# Patient Record
Sex: FEMALE | Race: WHITE | NOT HISPANIC OR LATINO | Employment: FULL TIME | URBAN - METROPOLITAN AREA
[De-identification: names, ages, dates, MRNs, and addresses within clinical notes are randomized per-mention and may not be internally consistent; named-entity substitution may affect disease eponyms.]

---

## 2017-02-20 ENCOUNTER — HOSPITAL ENCOUNTER (OUTPATIENT)
Dept: MAMMOGRAPHY | Facility: HOSPITAL | Age: 47
Discharge: HOME | End: 2017-02-20
Attending: OBSTETRICS & GYNECOLOGY

## 2017-02-20 DIAGNOSIS — Z12.31 VISIT FOR SCREENING MAMMOGRAM: ICD-10-CM

## 2018-07-25 ENCOUNTER — HOSPITAL ENCOUNTER (OUTPATIENT)
Dept: MAMMOGRAPHY | Facility: CLINIC | Age: 48
Discharge: HOME | End: 2018-07-25
Attending: OBSTETRICS & GYNECOLOGY

## 2018-07-25 DIAGNOSIS — Z12.31 VISIT FOR SCREENING MAMMOGRAM: ICD-10-CM

## 2019-01-07 ENCOUNTER — DOCUMENTATION ONLY (OUTPATIENT)
Dept: OTHER | Facility: CLINIC | Age: 49
End: 2019-01-07

## 2019-09-16 ENCOUNTER — HOSPITAL ENCOUNTER (OUTPATIENT)
Dept: MAMMOGRAPHY | Facility: CLINIC | Age: 49
Discharge: HOME | End: 2019-09-16
Attending: OBSTETRICS & GYNECOLOGY

## 2019-09-16 DIAGNOSIS — Z12.31 VISIT FOR SCREENING MAMMOGRAM: ICD-10-CM

## 2019-09-20 ENCOUNTER — OFFICE VISIT (OUTPATIENT)
Dept: SLEEP MEDICINE | Facility: CLINIC | Age: 49
End: 2019-09-20
Payer: COMMERCIAL

## 2019-09-20 VITALS
OXYGEN SATURATION: 98 % | WEIGHT: 204 LBS | SYSTOLIC BLOOD PRESSURE: 113 MMHG | BODY MASS INDEX: 30.92 KG/M2 | DIASTOLIC BLOOD PRESSURE: 77 MMHG | HEIGHT: 68 IN | HEART RATE: 84 BPM

## 2019-09-20 DIAGNOSIS — G47.01 INSOMNIA DUE TO MEDICAL CONDITION: Primary | ICD-10-CM

## 2019-09-20 PROCEDURE — 99205 OFFICE O/P NEW HI 60 MIN: CPT | Performed by: FAMILY MEDICINE

## 2019-09-20 RX ORDER — SERTRALINE HYDROCHLORIDE 100 MG/1
TABLET, FILM COATED ORAL
COMMUNITY
Start: 2019-08-21

## 2019-09-20 RX ORDER — CLONAZEPAM 1 MG/1
TABLET ORAL
Qty: 30 TABLET | Refills: 5 | Status: SHIPPED | OUTPATIENT
Start: 2019-09-20 | End: 2019-10-31

## 2019-09-20 RX ORDER — NORETHINDRONE ACETATE AND ETHINYL ESTRADIOL .03; 1.5 MG/1; MG/1
1 TABLET ORAL
COMMUNITY
End: 2019-09-20

## 2019-09-20 RX ORDER — NORETHINDRONE ACETATE AND ETHINYL ESTRADIOL, ETHINYL ESTRADIOL AND FERROUS FUMARATE 1MG-10(24)
KIT ORAL
COMMUNITY
Start: 2019-09-19

## 2019-09-20 ASSESSMENT — MIFFLIN-ST. JEOR: SCORE: 1594.87

## 2019-09-20 NOTE — PATIENT INSTRUCTIONS
Your BMI is Body mass index is 31.25 kg/m .  Weight management is a personal decision.  If you are interested in exploring weight loss strategies, the following discussion covers the approaches that may be successful. Body mass index (BMI) is one way to tell whether you are at a healthy weight, overweight, or obese. It measures your weight in relation to your height.  A BMI of 18.5 to 24.9 is in the healthy range. A person with a BMI of 25 to 29.9 is considered overweight, and someone with a BMI of 30 or greater is considered obese. More than two-thirds of American adults are considered overweight or obese.  Being overweight or obese increases the risk for further weight gain. Excess weight may lead to heart disease and diabetes.  Creating and following plans for healthy eating and physical activity may help you improve your health.  Weight control is part of healthy lifestyle and includes exercise, emotional health, and healthy eating habits. Careful eating habits lifelong are the mainstay of weight control. Though there are significant health benefits from weight loss, long-term weight loss with diet alone may be very difficult to achieve- studies show long-term success with dietary management in less than 10% of people. Attaining a healthy weight may be especially difficult to achieve in those with severe obesity. In some cases, medications, devices and surgical management might be considered.  What can you do?  If you are overweight or obese and are interested in methods for weight loss, you should discuss this with your provider.     Consider reducing daily calorie intake by 500 calories.     Keep a food journal.     Avoiding skipping meals, consider cutting portions instead.    Diet combined with exercise helps maintain muscle while optimizing fat loss. Strength training is particularly important for building and maintaining muscle mass. Exercise helps reduce stress, increase energy, and improves fitness.  Increasing exercise without diet control, however, may not burn enough calories to loose weight.       Start walking three days a week 10-20 minutes at a time    Work towards walking thirty minutes five days a week     Eventually, increase the speed of your walking for 1-2 minutes at time    In addition, we recommend that you review healthy lifestyles and methods for weight loss available through the National Institutes of Health patient information sites:  http://win.niddk.nih.gov/publications/index.htm    And look into health and wellness programs that may be available through your health insurance provider, employer, local community center, or parminder club.    Weight management plan: Patient was referred to their PCP to discuss a diet and exercise plan.

## 2019-09-20 NOTE — PROGRESS NOTES
"Sleep Consultation:    Date on this visit: 9/20/2019    Zeynep Khan is self-referred for a sleep consultation regarding chronic insomnia.    Primary Physician: No Ref-Primary, Physician     Chief Complaint: \"I haven't slept well in 9 years.\"    HPI:  Zeynep Khan is a pleasant 47 yo who presents for multiple year history of difficulty falling and staying asleep.    Complex and pertinent PMHx of ulcerative colitis s/p partial colectomy, PTSD, unclear demyelinating disorder, chronic migraines, anxiety.    Zeynep feels she actually slept well until ~9 years ago and links it to the diagnosis of ulcerative colitis, treatment with repeat steroids, partial colectomy.  Prior to this, she slept for 9-10 hours from 9pm to 6am.  She feel that her sleep never seemed to improve.  Currently, she estimates her total sleep time at 2-3 hours most nights.    She reports that she is following strict sleep hygiene, stimulus control given that her daughter follows with Dr. Bloom for chronic insomnia.    Most nights, in bed ~9:30pm.  She will typically take 1-2 hours to initiate sleep, usually around 11:30pm.  It is somewhat unclear to me what she is doing during this time prior to falling asleep.  She will awaken spontaneously around 1:30am and will then have difficulty and often not return to sleep.  She does practice stimulus control during this time.  She is up for the day at 6am.  She states taking only rare naps.  Her sleep away from is typically worse and is unsure of any changes when going to bed later than typical    For her sleep, she has tried a number of medications:  - Benadryl -> minimal benefit  - Zolpidem -> minimal benefit  - Eszopiclone -> minimal benefit  - Trazodone -> minimal benefit  - Muscle relaxants -> minimal benefit  - Lorazepam -> benefit, but not used routinely for sleep    She denies any snoring, observed apnea.  Denies any abnormal nocturnal behaviors.    She can awaken with feeling of heart racing, " SOB.    Negative narcolepsy triad.    She currently will have a flare of her ulcerative colitis ~1-2x / week, usually managed with steroids.  Does not need to defecate overnight, due to tight control on diet.    She lives with her daughter, age 17, who has significant auto-immune disorders.  Overall, feels stress is relatively low.  Works as an occupational therapist.    Patient's [place] Sleepiness score 4/24 consistent with no daytime sleepiness.        Allergies:    Allergies   Allergen Reactions     Black Pepper [[place]] Anaphylaxis     Contrast Dye Anaphylaxis     Wasp Venom Anaphylaxis     Amoxicillin Itching     Droperidol Other (See Comments)     Hyperactivity      Penicillins Hives     Prochlorperazine Unknown     Sulfa Drugs GI Disturbance       Medications:    Current Outpatient Medications   Medication Sig Dispense Refill     benzonatate (TESSALON) 200 MG capsule Take 1 capsule (200 mg) by mouth 3 times daily as needed for cough 30 capsule 3     dexlansoprazole (DEXILANT) 60 MG CPDR Take 60 mg by mouth daily       Dicyclomine HCl (BENTYL PO) Take 20 mg by mouth 4 times daily       DiphenhydrAMINE HCl (BENADRYL PO) Take by mouth as needed       drospirenone-ethinyl estradiol (LOVE) 3-0.03 MG per tablet        EPINEPHrine 0.3 MG/0.3ML injection 2-pack Inject 0.3 mg into the muscle as needed for anaphylaxis       Esomeprazole Magnesium (NEXIUM PO) Take 40 mg by mouth daily       Ondansetron (ZOFRAN ODT PO) Take by mouth 2 times daily         Problem List:  There are no active problems to display for this patient.       Past Medical/Surgical History:  History reviewed. No pertinent past medical history.  Past Surgical History:   Procedure Laterality Date     ENT SURGERY         Social History:  Social History     Socioeconomic History     Marital status:      Spouse name: Not on file     Number of children: Not on file     Years of education: Not on file     Highest education level: Not on file    Occupational History     Not on file   Social Needs     Financial resource strain: Not on file     Food insecurity:     Worry: Not on file     Inability: Not on file     Transportation needs:     Medical: Not on file     Non-medical: Not on file   Tobacco Use     Smoking status: Never Smoker     Smokeless tobacco: Never Used   Substance and Sexual Activity     Alcohol use: No     Drug use: No     Sexual activity: Not Currently     Partners: Male     Birth control/protection: Pill   Lifestyle     Physical activity:     Days per week: Not on file     Minutes per session: Not on file     Stress: Not on file   Relationships     Social connections:     Talks on phone: Not on file     Gets together: Not on file     Attends [employer] service: Not on file     Active member of club or organization: Not on file     Attends meetings of clubs or organizations: Not on file     Relationship status: Not on file     Intimate partner violence:     Fear of current or ex partner: Not on file     Emotionally abused: Not on file     Physically abused: Not on file     Forced sexual activity: Not on file   Other Topics Concern     Not on file   Social History Narrative     Not on file       Family History:  Family History   Problem Relation Age of Onset     Dementia Paternal Grandmother      Cancer Father      Hypertension Father      Migraines Maternal Grandmother        Review of Systems:  A complete review of systems reviewed by me is negative with the exeption of what has been mentioned in the history of present illness.  CONSTITUTIONAL:  POSITIVE for  weight gain and sweats  EYES: NEGATIVE for changes in vision, blind spots, double vision.  ENT: NEGATIVE for ear pain, sore throat, sinus pain, post-nasal drip, runny nose, bloody nose  CARDIAC:  POSITIVE for  fast heart beats  NEUROLOGIC:  POSITIVE for  headaches and weakness or numbness in the arms or legs  DERMATOLOGIC: NEGATIVE for rashes, new moles or change in  "mole(s)  PULMONARY:  POSITIVE for  dry cough  GASTROINTESTINAL:  POSITIVE for  nausea and loose or watery stools  GENITOURINARY:  POSITIVE for  urinating more frequently than usual  MUSCULOSKELETAL:  POSITIVE for  bone or joint pain and swollen joints  ENDOCRINE: NEGATIVE for increased thirst or urination, diabetes.  LYMPHATIC: NEGATIVE for swollen lymph nodes, lumps or bumps in the breasts or nipple discharge.    Physical Examination:  Vitals: /77   Pulse 84   Ht 1.721 m (5' 7.75\")   Wt 92.5 kg (204 lb)   SpO2 98%   BMI 31.25 kg/m    BMI= Body mass index is 31.25 kg/m .    Neck Cir (cm): 34 cm    [place] Total Score 9/20/2019   Total score - [place] 4            GENERAL APPEARANCE: healthy, alert, no distress and over weight  RESP: lungs clear to auscultation - no rales, rhonchi or wheezes  CV: regular rates and rhythm, normal S1 S2, no S3 or S4 and no murmur, click or rub  PSYCH: mentation appears normal and affect normal/bright    Impression/Plan:    Zeynep Khan is a pleasant 49 yo who presents for multiple year history of difficulty falling and staying asleep.    Complex and pertinent PMHx of ulcerative colitis s/p partial colectomy, PTSD, unclear demyelinating disorder, chronic migraines, anxiety.    1.)  Chronic sleep onset and maintenance insomnia   - Onset of symptoms with diagnosis and treatment of ulcerative colitis ~9 years ago.   - Overall, appears to be strongly multi-factorial with components of significant medical illness, conditioned / psychophysiological insomnia, sleep state misperception (reported TST 2-3 hours, normal [place]).   - Reports following consistent sleep hygiene, stimulus control.   - Overall, I feel a combination of continued behavioral modification, consideration of CBT for insomnia, and medication is reasonable.  Plan for trial of clonazepam 0.5-1mg PO at bedtime.  Touch base in 1-2 weeks by phone.   - Will hold on in-lab PSG given low clinical concern for ROSS, but " can't rule out periodic limb movement disorder.  Also consider actigraphy if minimal response to medication.      Plan as given to patient as above.    I have spent 60 minutes with this patient today in which greater than 50% of this time was spent in the counseling / coordination of care regarding insomnia.    Santiago Veloz MD    CC: No ref. provider found

## 2019-09-20 NOTE — NURSING NOTE
"Chief Complaint   Patient presents with     Consult For     Consult per Dr. Adams RE: Difficult falling and staying asleep.        Initial /77   Pulse 84   Ht 1.721 m (5' 7.75\")   Wt 92.5 kg (204 lb)   SpO2 98%   BMI 31.25 kg/m   Estimated body mass index is 31.25 kg/m  as calculated from the following:    Height as of this encounter: 1.721 m (5' 7.75\").    Weight as of this encounter: 92.5 kg (204 lb).    Medication Reconciliation: complete    Neck circumference: 13.5 inches / 33.5 centimeters.    DME: none  "

## 2019-10-09 ENCOUNTER — TELEPHONE (OUTPATIENT)
Dept: SLEEP MEDICINE | Facility: CLINIC | Age: 49
End: 2019-10-09

## 2019-10-09 NOTE — TELEPHONE ENCOUNTER
Pt called in to give update that the Clonazepam is not helping. She has increased to 1 tablet, and would like to know how to proceed.     Lizzie Linares MA on 10/9/2019 at 9:05 AM

## 2019-10-10 NOTE — TELEPHONE ENCOUNTER
I would recommend we try a trial of increasing to 1.5 tablets for 2-3 nights and then to 2 tablets.  If still minimal efficacy, I would consider proceeding with actigraphy to get an objective picture of her sleep pattern.

## 2019-10-14 NOTE — TELEPHONE ENCOUNTER
Patient called back and I explained Dr. Veloz's notes below. She will increase the med as requested and call back next week to  Let us know how she is doing. If improved a new RX will need to be sent with a change in directions and Quantity. If not an actigraphy may be ordered.

## 2019-10-30 ENCOUNTER — MYC MEDICAL ADVICE (OUTPATIENT)
Dept: SLEEP MEDICINE | Facility: CLINIC | Age: 49
End: 2019-10-30

## 2019-10-30 DIAGNOSIS — G47.01 INSOMNIA DUE TO MEDICAL CONDITION: ICD-10-CM

## 2019-10-31 RX ORDER — CLONAZEPAM 1 MG/1
TABLET ORAL
Qty: 60 TABLET | Refills: 5 | Status: SHIPPED | OUTPATIENT
Start: 2019-10-31 | End: 2020-05-11

## 2019-12-15 ENCOUNTER — HEALTH MAINTENANCE LETTER (OUTPATIENT)
Age: 49
End: 2019-12-15

## 2020-05-11 DIAGNOSIS — G47.01 INSOMNIA DUE TO MEDICAL CONDITION: ICD-10-CM

## 2020-05-11 RX ORDER — CLONAZEPAM 1 MG/1
TABLET ORAL
Qty: 60 TABLET | Refills: 5 | Status: SHIPPED | OUTPATIENT
Start: 2020-05-11 | End: 2020-11-24

## 2020-05-11 NOTE — TELEPHONE ENCOUNTER
clonazePAM (KLONOPIN) 1 MG tablet   Last Written Prescription Date:  10/31/2019  Last Fill Quantity: 60,   # refills: 5  Last Office Visit: 9/20/19  Future Office visit:   1 year    Routing refill request to provider for review/approval because:  Drug not on the [facility], P or [facility] refill protocol or controlled substance

## 2020-11-24 DIAGNOSIS — G47.01 INSOMNIA DUE TO MEDICAL CONDITION: ICD-10-CM

## 2020-11-24 NOTE — TELEPHONE ENCOUNTER
clonazePAM (KLONOPIN) 1 MG tablet  Last Written Prescription Date:  5/11/2020  Last Fill Quantity: 60,   # refills: 5  Last Office Visit: 9/20/19  Future Office visit: 1 year   - Will send mychart     Routing refill request to provider for review/approval because:  Drug not on the [facility], P or [facility] refill protocol or controlled substance

## 2020-11-27 RX ORDER — CLONAZEPAM 1 MG/1
TABLET ORAL
Qty: 60 TABLET | Refills: 1 | Status: SHIPPED | OUTPATIENT
Start: 2020-11-27 | End: 2021-03-08

## 2020-12-07 ENCOUNTER — VIRTUAL VISIT (OUTPATIENT)
Dept: SLEEP MEDICINE | Facility: CLINIC | Age: 50
End: 2020-12-07
Payer: COMMERCIAL

## 2020-12-07 DIAGNOSIS — G47.01 INSOMNIA DUE TO MEDICAL CONDITION: Primary | ICD-10-CM

## 2020-12-07 PROCEDURE — 99214 OFFICE O/P EST MOD 30 MIN: CPT | Mod: GT | Performed by: FAMILY MEDICINE

## 2020-12-07 RX ORDER — TEMAZEPAM 15 MG/1
CAPSULE ORAL
Qty: 60 CAPSULE | Refills: 5 | Status: SHIPPED | OUTPATIENT
Start: 2020-12-07 | End: 2021-03-08

## 2020-12-07 NOTE — PROGRESS NOTES
"Zeynep Khan is a 50 year old female who is being evaluated via a billable video visit.      The patient has been notified of following:     \"This video visit will be conducted via a call between you and your physician/provider. We have found that certain health care needs can be provided without the need for an in-person physical exam.  This service lets us provide the care you need with a video conversation.  If a prescription is necessary we can send it directly to your pharmacy.  If lab work is needed we can place an order for that and you can then stop by our lab to have the test done at a later time.    Video visits are billed at different rates depending on your insurance coverage.  Please reach out to your insurance provider with any questions.    If during the course of the call the physician/provider feels a video visit is not appropriate, you will not be charged for this service.\"    Patient has given verbal consent for Video visit? Yes  How would you like to obtain your AVS? MyChart  If you are dropped from the video visit, the video invite should be resent to: Send to e-mail at: [email or web address]  Will anyone else be joining your video visit? No        Video-Visit Details    Type of service:  Video Visit    Video Start Time: 11:30am  Video End Time: 11:55am    Originating Location (pt. Location): Home    Distant Location (provider location):  [facility]     Platform used for Video Visit: [employer]    Virtual visit for annual follow-up of chronic insomnia.    Impression/Plan:    1.)  Chronic sleep onset and maintenance insomnia   - Onset of symptoms with diagnosis and treatment of ulcerative colitis ~9 years ago.   - Overall, appears to be strongly multi-factorial with components of significant medical illness, conditioned / psychophysiological insomnia, sleep state misperception (reported TST 2-4 hours, normal [place]).   - Reports following consistent sleep hygiene, " stimulus control.   - Prior response to clonazepam 2mg.   - Overall, I feel a combination of continued behavioral modification, consideration of CBT for insomnia, and medication is reasonable.  Plan for trial of changing to temazepam 15-30mg PO QHS.  Touch base in 1-2 weeks by phone.   - Will hold on in-lab PSG given low clinical concern for ROSS, but can't rule out periodic limb movement disorder.  Also consider actigraphy if minimal response to medication.    51 yo F with Complex and pertinent PMHx of ulcerative colitis s/p partial colectomy, PTSD, unclear demyelinating disorder, chronic migraines, anxiety.    Last office visit was our initial consult on 9/20/2019.  Chronic sleep onset and maintenance insomnia presumed with multi-factorial components of significant medical illness, psychophysiological insomnia, sleep-state misperception.  Prior trial of multiple medications.  Plan to continue behavioral modification, CBT for insomnia, trial of clonazepam 0.5-1mg PO at bedtime.    Today, she presents for annual follow-up.  She feels her sleep was stable up until ~3 months ago, prior to this, sleeping ~6 hours per night and seemed adequate.  She is not aware of any changes in medical changes or history around this time.  She feels her sleep has been more interrupted by what sounds like sleep starts.    Current pattern of taking clonazepam 1-2mg PO ~8pm.  In bed ~8:30pm.  Asleep ~9:30pm, will awaken spontaneously ~2 hours later and then feels she sleeps off / on.  Estimates total sleep time at 2-4 hours.    ---  This note was written with the assistance of the Dragon voice-dictation technology software. The final document, although reviewed, may contain errors. For corrections, please contact the office.    Santiago Veloz MD    Sleep Medicine  [facility]  ([phone])  [facility]  ([phone])

## 2020-12-07 NOTE — PATIENT INSTRUCTIONS

## 2021-01-15 ENCOUNTER — HEALTH MAINTENANCE LETTER (OUTPATIENT)
Age: 51
End: 2021-01-15

## 2021-01-23 ENCOUNTER — HEALTH MAINTENANCE LETTER (OUTPATIENT)
Age: 51
End: 2021-01-23

## 2021-03-05 VITALS — WEIGHT: 180 LBS | HEIGHT: 68 IN | BODY MASS INDEX: 27.28 KG/M2

## 2021-03-05 ASSESSMENT — MIFFLIN-ST. JEOR: SCORE: 1476

## 2021-03-05 NOTE — PATIENT INSTRUCTIONS
Your BMI is Body mass index is 27.57 kg/m .  Weight management is a personal decision.  If you are interested in exploring weight loss strategies, the following discussion covers the approaches that may be successful. Body mass index (BMI) is one way to tell whether you are at a healthy weight, overweight, or obese. It measures your weight in relation to your height.  A BMI of 18.5 to 24.9 is in the healthy range. A person with a BMI of 25 to 29.9 is considered overweight, and someone with a BMI of 30 or greater is considered obese. More than two-thirds of American adults are considered overweight or obese.  Being overweight or obese increases the risk for further weight gain. Excess weight may lead to heart disease and diabetes.  Creating and following plans for healthy eating and physical activity may help you improve your health.  Weight control is part of healthy lifestyle and includes exercise, emotional health, and healthy eating habits. Careful eating habits lifelong are the mainstay of weight control. Though there are significant health benefits from weight loss, long-term weight loss with diet alone may be very difficult to achieve- studies show long-term success with dietary management in less than 10% of people. Attaining a healthy weight may be especially difficult to achieve in those with severe obesity. In some cases, medications, devices and surgical management might be considered.  What can you do?  If you are overweight or obese and are interested in methods for weight loss, you should discuss this with your provider.     Consider reducing daily calorie intake by 500 calories.     Keep a food journal.     Avoiding skipping meals, consider cutting portions instead.    Diet combined with exercise helps maintain muscle while optimizing fat loss. Strength training is particularly important for building and maintaining muscle mass. Exercise helps reduce stress, increase energy, and improves fitness.  Increasing exercise without diet control, however, may not burn enough calories to loose weight.       Start walking three days a week 10-20 minutes at a time    Work towards walking thirty minutes five days a week     Eventually, increase the speed of your walking for 1-2 minutes at time    In addition, we recommend that you review healthy lifestyles and methods for weight loss available through the National Institutes of Health patient information sites:  http://win.niddk.nih.gov/publications/index.htm    And look into health and wellness programs that may be available through your health insurance provider, employer, local community center, or parminder club.    Weight management plan: Patient was referred to their PCP to discuss a diet and exercise plan.

## 2021-03-05 NOTE — PROGRESS NOTES
"Zeynep Khan is a 50 year old female who is being evaluated via a billable video visit.       The patient has been notified of following:      \"This video visit will be conducted via a call between you and your physician/provider. We have found that certain health care needs can be provided without the need for an in-person physical exam.  This service lets us provide the care you need with a video conversation.  If a prescription is necessary we can send it directly to your pharmacy.  If lab work is needed we can place an order for that and you can then stop by our lab to have the test done at a later time.     Video visits are billed at different rates depending on your insurance coverage.  Please reach out to your insurance provider with any questions.     If during the course of the call the physician/provider feels a video visit is not appropriate, you will not be charged for this service.\"     Patient has given verbal consent for Video visit? Yes  How would you like to obtain your AVS? Mail a copy  If you are dropped from the video visit, the video invite should be resent to: Text to cell phone: -  Will anyone else be joining your video visit? No  If patient encounters technical issues they should call [phone]      Video-Visit Details     Type of service:  Video Visit     Video Start Time: 0800  Video End Time: 0830    Originating Location (pt. Location): Home     Distant Location (provider location):  [facility]      Platform used for Video Visit: MedicaMet[employer]    Virtual visit for chronic insomnia.     Impression/Plan:     1.)  Chronic sleep onset and maintenance insomnia   - Onset of symptoms with diagnosis and treatment of ulcerative colitis 10+ years ago and s/p partial colectomy.   - Overall, appears to be strongly multi-factorial with components of significant medical illness, conditioned / psychophysiological insomnia, sleep state misperception (reported TST 2-4 hours, " normal Angelica).   - Reports following consistent sleep hygiene, stimulus control.   - Prior response to clonazepam 1-2mg, though with worsening symptoms ~3-6 months ago with unclear trigger.  Minimal response to clonazepam 0.5-1mg and temazepam 15-30mg since that time.   - Given minimal response, plan to proceed with actigraphy to assess for sleep-state misperception, plan to taper temazepam / clonazepam and trial of suvorexant 10-20mg PO QHS.  If significant sleep state misperception is seen, likely plan to proceed with formal cognitive-behavioral therapy for insomnia.    SUBJECTIVE:  Zeynep Khan is a 50 year old year old female.    Complex and pertinent PMHx of ulcerative colitis s/p partial colectomy, PTSD, unclear demyelinating disorder, chronic migraines, anxiety.    Last office visit was our initial consult on 9/20/2019.  Chronic sleep onset and maintenance insomnia presumed with multi-factorial components of significant medical illness, psychophysiological insomnia, sleep-state misperception.  Prior trial of multiple medications.  Plan to continue behavioral modification, CBT for insomnia, trial of clonazepam 0.5-1mg PO at bedtime.     12/7/2020 - She presents for annual follow-up.  She feels her sleep was stable up until ~3 months ago, prior to this, sleeping ~6 hours per night and seemed adequate.  She is not aware of any changes in medical changes or history around this time.  She feels her sleep has been more interrupted by what sounds like sleep starts.  Current pattern of taking clonazepam 1-2mg PO ~8pm.  In bed ~8:30pm.  Asleep ~9:30pm, will awaken spontaneously ~2 hours later and then feels she sleeps off / on.  Estimates total sleep time at 2-4 hours.  Plan for trial of temazepam 15-30, consider actigraphy.    Today -she presents today for 3-month follow-up.  She feels that the response to changing to temazepam has not been as well as she had hoped.  She feels she is sleeping soundly for 3-4 hours.  " She feels she still has significant issues with sleep maintenance on 5 out of 7 nights.  She also feels that she has had a few episodes at least of sleep eating since starting the temazepam, this was not reported previously with clonazepam or zolpidem or eszopiclone in the past.    She currently take her medication between 8-8:30 PM.  She will get into bed between 8-8:30 PM.  She will then seem to fall asleep between 10-10:30 PM, it is unclear to me what she is doing during this 1-2 hours prior to sleep onset.  She feels she will sleep soundly until roughly 1 AM.  She will then seem to awaken every 30-45 minutes until awakening naturally around 630-7 AM.  Since her temazepam, she has found her self feeling sleepy during the day, and has infrequently taken a 30-minute nap.    She denies any snoring or observed apnea.  She is working with a new therapist.  As reviewed medications, she does recall taking nortriptyline years ago after her partial colectomy for pain, but does not recall if it had benefits for sleep.       Past medical history:    There are no active problems to display for this patient.      10 point ROS of systems including Constitutional, Eyes, Respiratory, Cardiovascular, Gastroenterology, Genitourinary, Integumentary, Muscularskeletal, Psychiatric were all negative except for pertinent positives noted in my HPI.    OBJECTIVE:  Ht 1.721 m (5' 7.75\")   Wt 81.6 kg (180 lb)   BMI 27.57 kg/m      Physical Exam  Constitutional:       General: She is not in acute distress.     Appearance: Normal appearance. She is not ill-appearing, toxic-appearing or diaphoretic.   HENT:      Head: Normocephalic and atraumatic.      Right Ear: External ear normal.      Left Ear: External ear normal.      Nose: Nose normal.   Eyes:      Conjunctiva/sclera: Conjunctivae normal.   Pulmonary:      Effort: Pulmonary effort is normal. No respiratory distress.   Skin:     Coloration: Skin is not jaundiced or pale.      " Findings: No bruising or erythema.   Neurological:      General: No focal deficit present.      Mental Status: She is alert and oriented to person, place, and time.   Psychiatric:         Mood and Affect: Mood normal.         Behavior: Behavior normal.         Thought Content: Thought content normal.         Judgment: Judgment normal.          ---  This note was written with the assistance of the Dragon voice-dictation technology software. The final document, although reviewed, may contain errors. For corrections, please contact the office.    Santiago Veloz MD    Sleep Medicine  [facility]  ([phone])  [facility]  ([phone])

## 2021-03-08 ENCOUNTER — VIRTUAL VISIT (OUTPATIENT)
Dept: SLEEP MEDICINE | Facility: CLINIC | Age: 51
End: 2021-03-08
Payer: COMMERCIAL

## 2021-03-08 DIAGNOSIS — G47.01 INSOMNIA DUE TO MEDICAL CONDITION: ICD-10-CM

## 2021-03-08 PROCEDURE — 99214 OFFICE O/P EST MOD 30 MIN: CPT | Mod: GT | Performed by: FAMILY MEDICINE

## 2021-03-08 RX ORDER — CLONAZEPAM 1 MG/1
TABLET ORAL
Qty: 60 TABLET | Refills: 1 | Status: SHIPPED | OUTPATIENT
Start: 2021-03-08 | End: 2021-07-30

## 2021-03-10 NOTE — TELEPHONE ENCOUNTER
Dave faxed notification Belsomra is not covered medication on Zeynep's Insurance.  Covered medication would be zolpidem 10 mg tab, no pa required, Dayvigo 5 mg tabs, pa required.  Routed to Dr. Veloz for review.

## 2021-03-11 ENCOUNTER — TELEPHONE (OUTPATIENT)
Dept: SLEEP MEDICINE | Facility: CLINIC | Age: 51
End: 2021-03-11

## 2021-03-11 RX ORDER — LEMBOREXANT 5 MG/1
1 TABLET, FILM COATED ORAL AT BEDTIME
Qty: 30 TABLET | Refills: 3 | Status: SHIPPED | OUTPATIENT
Start: 2021-03-11 | End: 2022-10-21

## 2021-03-11 NOTE — TELEPHONE ENCOUNTER
Dayvigo (Lemborexant) is also a hypocretin / orexin antogonist.  Will send rx for Dayvigo 5mg PO at bedtime for PA.

## 2021-03-11 NOTE — TELEPHONE ENCOUNTER
Prior Authorization Specialty Medication Request    Medication/Dose: rec'd faxed prior auth Dave Hazel, DAyvigo 5 mg tabs      Dave initiated prior auth 1-4 [employer]  KEY;  [id number]  ICD code (if different than what is on RX):    Previously Tried and Failed:      Important Lab Values:   Rationale:  Insurance Name:  open access  Insurance ID: [id number]  Insurance Phone Number:  Pharmacy Information (if different than what is on RX)  Name: [facility]   Phone:  [phone]      Routed PA Med Team, Dr. Veloz for review.

## 2021-03-11 NOTE — TELEPHONE ENCOUNTER
Central Prior Authorization Team   Phone: [phone]      PA Initiation    Medication: Lemborexant (DAYVIGO) 5 MG TABS  Insurance Company: [employer] - Phone [phone] Fax [phone]  Pharmacy Filling the Rx: [id number] [place], [place] - [address] AT [facility] [place] [place] [address]  Filling Pharmacy Phone: [phone]  Filling Pharmacy Fax:    Start Date: 3/11/2021

## 2021-03-12 NOTE — TELEPHONE ENCOUNTER
Prior Authorization Approval    Authorization Effective Date: 2/12/2021  Authorization Expiration Date: 3/12/2024  Medication: Lemborexant (DAYVIGO) 5 MG TABS  Approved Dose/Quantity: 30  Reference #: [id number]   Insurance Company: [employer] - Phone [phone] Fax [phone]  Expected CoPay:       Which Pharmacy is filling the prescription (Not needed for infusion/clinic administered): [id number] [address] AT [facility] [address]  Pharmacy Notified: Yes - spoke to female staff. They will order medication for Monday if they don't have enough, patient will be notified when rx is ready.  Patient Notified: No

## 2021-03-15 ENCOUNTER — TELEPHONE (OUTPATIENT)
Dept: SLEEP MEDICINE | Facility: CLINIC | Age: 51
End: 2021-03-15

## 2021-03-15 NOTE — TELEPHONE ENCOUNTER
[facility] to schedule Acti w/ PSG and follow up with Dr. Veloz.

## 2021-03-29 ENCOUNTER — TELEPHONE (OUTPATIENT)
Dept: SLEEP MEDICINE | Facility: CLINIC | Age: 51
End: 2021-03-29

## 2021-05-18 ENCOUNTER — TELEPHONE (OUTPATIENT)
Dept: SLEEP MEDICINE | Facility: CLINIC | Age: 51
End: 2021-05-18

## 2021-05-18 NOTE — TELEPHONE ENCOUNTER
I called to set up Zeynep's acti  and drop off. She mentioned that she called and had staff send a few messages to Dr. Veloz. She was wondering if he was still with [facility] as she hadn't hears back. She has questioned about her medications and would like a call back. The best number to call is [phone]. I will forward this message to Dr. Veloz. She did not want to schedule her follow up at this time as her schedule is changing a lot right now.

## 2021-06-24 ENCOUNTER — OFFICE VISIT (OUTPATIENT)
Dept: SLEEP MEDICINE | Facility: CLINIC | Age: 51
End: 2021-06-24
Payer: COMMERCIAL

## 2021-06-24 DIAGNOSIS — F51.04 CHRONIC INSOMNIA: Primary | ICD-10-CM

## 2021-06-29 ENCOUNTER — TELEPHONE (OUTPATIENT)
Dept: SLEEP MEDICINE | Facility: CLINIC | Age: 51
End: 2021-06-29

## 2021-06-29 NOTE — TELEPHONE ENCOUNTER
Zeynep called in and left a message that she was not able to complete actigraphy. She had an emergency turn up and needed to return the device. She wanted to know if a neighbor could return. I called her twice but left one message for her to have someone return the device but I have not heard back from her.

## 2021-07-14 ENCOUNTER — TELEPHONE (OUTPATIENT)
Dept: SLEEP MEDICINE | Facility: CLINIC | Age: 51
End: 2021-07-14

## 2021-07-14 NOTE — TELEPHONE ENCOUNTER
Zeynep called a couple of times. She first called to say she could not finish acti watch because of work. Sleep diaries were completed only up to day 3. She wrote on the diaries that she also called and was told the device was not recording. No diaries were uploaded to Epic.

## 2021-07-15 ENCOUNTER — APPOINTMENT (OUTPATIENT)
Dept: SLEEP MEDICINE | Facility: CLINIC | Age: 51
End: 2021-07-15
Payer: COMMERCIAL

## 2021-07-21 ENCOUNTER — RECORDS - HEALTHEAST (OUTPATIENT)
Dept: ADMINISTRATIVE | Facility: CLINIC | Age: 51
End: 2021-07-21

## 2021-07-22 ENCOUNTER — RECORDS - HEALTHEAST (OUTPATIENT)
Dept: SCHEDULING | Facility: CLINIC | Age: 51
End: 2021-07-22

## 2021-07-22 DIAGNOSIS — Z12.31 OTHER SCREENING MAMMOGRAM: ICD-10-CM
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
## 2021-07-30 ENCOUNTER — VIRTUAL VISIT (OUTPATIENT)
Dept: SLEEP MEDICINE | Facility: CLINIC | Age: 51
End: 2021-07-30
Payer: COMMERCIAL

## 2021-07-30 DIAGNOSIS — G47.01 INSOMNIA DUE TO MEDICAL CONDITION: ICD-10-CM

## 2021-07-30 DIAGNOSIS — G98.8 NEUROLOGICAL DISORDER: ICD-10-CM

## 2021-07-30 DIAGNOSIS — K51.919 ULCERATIVE COLITIS WITH COMPLICATION, UNSPECIFIED LOCATION (H): Primary | ICD-10-CM

## 2021-07-30 DIAGNOSIS — F43.10 PTSD (POST-TRAUMATIC STRESS DISORDER): ICD-10-CM

## 2021-07-30 PROCEDURE — 99214 OFFICE O/P EST MOD 30 MIN: CPT | Mod: GT | Performed by: FAMILY MEDICINE

## 2021-07-30 RX ORDER — CLONAZEPAM 1 MG/1
TABLET ORAL
Qty: 45 TABLET | Refills: 1 | Status: SHIPPED | OUTPATIENT
Start: 2021-07-30 | End: 2022-10-21

## 2021-07-30 RX ORDER — GABAPENTIN 300 MG/1
900 CAPSULE ORAL AT BEDTIME
Qty: 90 CAPSULE | Refills: 3 | Status: SHIPPED | OUTPATIENT
Start: 2021-07-30 | End: 2022-10-21

## 2021-07-30 NOTE — PROGRESS NOTES
"Zeynep is a 50 year old who is being evaluated via a billable video visit.      How would you like to obtain your AVS? MyChart  If the video visit is dropped, the invitation should be resent by: Text to cell phone: [phone]  Will anyone else be joining your video visit? No  {If patient encounters technical issues they should call [phone] :[id number]}    Meenakshi Mcguire CMA    Video Start Time: {video visit start/end time for provider to select:152948}  Video-Visit Details    Type of service:  Video Visit    Video End Time:{video visit start/end time for provider to select:152948}    Originating Location (pt. Location): {video visit patient location:130474::\"Home\"}    Distant Location (provider location):  [facility]     Platform used for Video Visit: {Virtual Visit Platforms:695728::\"Tasspass\"}  "

## 2021-10-24 ENCOUNTER — HEALTH MAINTENANCE LETTER (OUTPATIENT)
Age: 51
End: 2021-10-24

## 2021-12-27 ENCOUNTER — ANCILLARY PROCEDURE (OUTPATIENT)
Dept: MAMMOGRAPHY | Facility: CLINIC | Age: 51
End: 2021-12-27
Attending: OBSTETRICS & GYNECOLOGY
Payer: COMMERCIAL

## 2021-12-27 DIAGNOSIS — Z12.31 VISIT FOR SCREENING MAMMOGRAM: ICD-10-CM

## 2021-12-27 PROCEDURE — 77063 BREAST TOMOSYNTHESIS BI: CPT

## 2022-02-13 ENCOUNTER — HEALTH MAINTENANCE LETTER (OUTPATIENT)
Age: 52
End: 2022-02-13

## 2022-02-16 DIAGNOSIS — G98.8 NEUROLOGICAL DISORDER: ICD-10-CM

## 2022-02-16 DIAGNOSIS — G47.01 INSOMNIA DUE TO MEDICAL CONDITION: ICD-10-CM

## 2022-02-16 DIAGNOSIS — F43.10 PTSD (POST-TRAUMATIC STRESS DISORDER): ICD-10-CM

## 2022-02-16 DIAGNOSIS — K51.919 ULCERATIVE COLITIS WITH COMPLICATION, UNSPECIFIED LOCATION (H): ICD-10-CM

## 2022-02-18 NOTE — TELEPHONE ENCOUNTER
Pending Prescriptions:                       Disp   Refills    Suvorexant (BELSOMRA) 20 MG tablet        30 tab*5            Sig: Take 1 tablet (20 mg) by mouth At Bedtime    Last Written Prescription Date:  7/30/21  Last Fill Quantity: 30,   # refills: 5  Last Office Visit with [facility], [facility] or [facility] prescribing provider: 7/30/2021  Future Office visit:   No follow up scheduled at this time.    LUIS Mercado

## 2022-07-15 ENCOUNTER — TELEPHONE (OUTPATIENT)
Dept: NEUROSURGERY | Facility: CLINIC | Age: 52
End: 2022-07-15

## 2022-07-15 NOTE — TELEPHONE ENCOUNTER
[facility] Call Center    Phone Message    May a detailed message be left on voicemail: yes     Reason for Call: Other: Pt is calling to see if she can schedule an appt with a neurosurgeon for Dx: Trigeminal Neuralgia     Action Taken: Message routed to:  [facility] Neurosurgery    Travel Screening: Not Applicable

## 2022-07-19 NOTE — TELEPHONE ENCOUNTER
7/19/2022-Voicemail left for patient asking for more recent records and Images since 2014-MR @ 237pm    7/22/2022-Spoke with [facility] to have images pushed ASAP-MR @ 930am    7/25/2022-[facility] Images now in PACS-MR @ 424am      FUTURE VISIT INFORMATION      FUTURE VISIT INFORMATION:    Date: 7/26/2022    Time: 230pm    [place] [facility] [place]FORMATION:    Referring provider:  Self     Referring providers clinic:      Reason for visit/diagnosis  Trigeminal Neuralgia     RECORDS REQUESTED FROM:       Clinic name Comments Records  Status Imaging Status   [facility]  Dr. Pate-8/16/2021 Care Everywhere No Images          [facility]  CT Head-3/12/2014 Care Everywhere PACS?

## 2022-07-26 ENCOUNTER — VIRTUAL VISIT (OUTPATIENT)
Dept: NEUROSURGERY | Facility: CLINIC | Age: 52
End: 2022-07-26
Payer: COMMERCIAL

## 2022-07-26 ENCOUNTER — PRE VISIT (OUTPATIENT)
Dept: NEUROSURGERY | Facility: CLINIC | Age: 52
End: 2022-07-26

## 2022-07-26 DIAGNOSIS — G50.0 TRIGEMINAL NEURALGIA: Primary | ICD-10-CM

## 2022-07-26 PROCEDURE — 99204 OFFICE O/P NEW MOD 45 MIN: CPT | Mod: GT | Performed by: NURSE PRACTITIONER

## 2022-07-26 RX ORDER — FAMOTIDINE 40 MG/1
40 TABLET, FILM COATED ORAL 2 TIMES DAILY
COMMUNITY
Start: 2021-02-27

## 2022-07-26 RX ORDER — RIMEGEPANT SULFATE 75 MG/75MG
75 TABLET, ORALLY DISINTEGRATING ORAL
COMMUNITY
Start: 2021-08-16

## 2022-07-26 RX ORDER — CARBAMAZEPINE 100 MG/1
100 TABLET, EXTENDED RELEASE ORAL 2 TIMES DAILY
Qty: 60 TABLET | Refills: 1 | Status: SHIPPED | OUTPATIENT
Start: 2022-07-26 | End: 2022-11-11

## 2022-07-26 RX ORDER — FLUTICASONE PROPIONATE 50 MCG
250 SPRAY, SUSPENSION (ML) NASAL
COMMUNITY
Start: 2022-05-06

## 2022-07-26 RX ORDER — DICYCLOMINE HCL 20 MG
20 TABLET ORAL
COMMUNITY
Start: 2022-02-17

## 2022-07-26 NOTE — LETTER
2022       RE: Zeynep Khan  [address] [facility] [address]     Dear Colleague,    Thank you for referring your patient, Zeynep Khan, to the [facility] NEUROSURGERY CLINIC [facility]. Please see a copy of my visit note below.      [facility]  Department of Neurosurgery      Name: Zeynep Khan  MRN: [id number]  Age: 51 year old  : 1970  Referring provider: Referred Self  2022      Chief Complaint:   Right-sided trigeminal neuralgia  New patient    History of Present Illness:  Per patient, she had initial symptoms of trigeminal neuralgia since around .  This came on suddenly without any precipitating factors.  Patient has pain in right V2 and V3 distribution.  [facility] and was started on oxcarbazepine.  Per patient this did not control her symptoms effectively.  She was eventually started on gabapentin and is currently taking 900 mg at bedtime.    Pain never cross from one side to the other side of your face. Pain never travel outside of face (i.e. into neck, behind ear, or to the top of head).  Denies autonomic symptoms like facial flushing, eyelid drooping or swelling, eye redness, tearing, nasal stuffiness, sweating, and ear fullness.    Patient has a diagnosis of anxiety and she is currently on sertraline.  Overall her mood is controlled on current medications.  She follows up with a therapist on a regular basis.    Onset:   Summer 2019    Location:   Right V3 mainly and some in right V2    Description:   Sharp, electric shocklike sensations.    Intermittent/ Constant:   Intermittent    Triggers:   Talking, cool wind, chewing    Prior evaluations:  [facility]    Prior Procedures:  No procedures    Previously tried medications:   Oxcarbazepine-patient did not have symptom  "relief    Current medications:   Gabapentin 900 mg at bedtime      OTHER IMPORTANT MEDICAL INFORMATION:  Have you been diagnosed with multiple sclerosis?   Patient had abnormal brain MRIs in the past showing white matter lesions consistent with a demyelinating disease.  She had a negative lumbar puncture.  Never been treated for MS.    Other medical co morbidities:  Possible MS  Trigeminal neuralgia  Chronic kidney disease stage III  Ulcerative colitis  Anxiety  Irritable bowel syndrome  Insomnia  Cholecystectomy  Partial colon resection    Do you have a pacemaker or a history of heart conditions?   No    Do you take any prescription blood thinners? (examples: Coumadin/Warfarin, Eliquis, Plavix, Lovenox, Pradaxa, Xarelto)   No    Do you take aspirin (ASA, either 81mg or 325mg)?   No    Social History:  She is .  She has 2 kids ages 20 and 22.  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  She is planning to take long-term disability for 1 year due to her health issues.  She was recently diagnosed with a chronic kidney disease stage III.  She denies smoking, alcohol or drug use.    Imaging:  No new imaging.  Patient had a sewing needle on her left buttocks area and is unable to have MRIs.  She had MRIs until a few years ago and tolerated it well.  But on a recent MRI the needle was \"reacted to the MRI\" and she was told she can not have any additional MRIs.    Physical Exam:  Neuro: The patient is fully oriented. Speech is normal.    Psych: Normal mood and affect. Behavior is normal.      Assessment:  Right-sided trigeminal neuralgia  New patient    Plan:  Patient is currently on gabapentin 900 mg at night.  She continues to have at least 2-3 episodes of right-sided trigeminal neuralgia.  Patient did not have complete relief of symptoms on oxcarbazepine.  We will start the patient on carbamazepine and she will gradually titrate this up to 100 mg twice daily to minimize the side effects.  I will " follow-up with the patient in 6 weeks.  We will also complete a CBC and a CMP for baseline.  Patient would like to have this lab work completed at [facility] in [place].      Katie Newsome CNP  Department of Neurosurgery      I spent 40 minutes on patient care activities related to this encounter on the date of service, including time spent reviewing the chart, obtaining history and examination and in counseling the patient, and in documentation in the electronic medical record.

## 2022-07-26 NOTE — PROGRESS NOTES
Zeynep is a 51 year old who is being evaluated via a billable video visit.      How would you like to obtain your AVS? MyChart  If the video visit is dropped, the invitation should be resent by: Text to cell phone: [phone]  Will anyone else be joining your video visit? No        Video-Visit Details    Video Start Time: 250    Type of service:  Video Visit    Video End Time:3.30    Originating Location (pt. Location): Home    Distant Location (provider location):  [facility] NEUROSURGERY [facility]INIC [place]     Platform used for Video Visit: [employer]     [facility]  Department of Neurosurgery      Name: Zeynep Khan  MRN: [id number]  Age: 51 year old  : 1970  Referring provider: Referred Self  2022      Chief Complaint:   Right-sided trigeminal neuralgia  New patient    History of Present Illness:  Per patient, she had initial symptoms of trigeminal neuralgia since around .  This came on suddenly without any precipitating factors.  Patient has pain in right V2 and V3 distribution.  She was initially seen at [facility] neurology department and was started on oxcarbazepine.  Per patient this did not control her symptoms effectively.  She was eventually started on gabapentin and is currently taking 900 mg at bedtime.    Pain never cross from one side to the other side of your face. Pain never travel outside of face (i.e. into neck, behind ear, or to the top of head).  Denies autonomic symptoms like facial flushing, eyelid drooping or swelling, eye redness, tearing, nasal stuffiness, sweating, and ear fullness.    Patient has a diagnosis of anxiety and she is currently on sertraline.  Overall her mood is controlled on current medications.  She follows up with a therapist on a regular basis.    Onset:   Summer 2019    Location:   Right V3 mainly and some in right V2    Description:   Sharp, electric shocklike sensations.    Intermittent/ Constant:  "  Intermittent    Triggers:   Talking, cool wind, chewing    Prior evaluations:  Previously seen by [facility] neurology and [facility]    Prior Procedures:  No procedures    Previously tried medications:   Oxcarbazepine-patient did not have symptom relief    Current medications:   Gabapentin 900 mg at bedtime      OTHER IMPORTANT MEDICAL INFORMATION:  Have you been diagnosed with multiple sclerosis?   Patient had abnormal brain MRIs in the past showing white matter lesions consistent with a demyelinating disease.  She had a negative lumbar puncture.  Never been treated for MS.    Other medical co morbidities:  Possible MS  Trigeminal neuralgia  Chronic kidney disease stage III  Ulcerative colitis  Anxiety  Irritable bowel syndrome  Insomnia  Cholecystectomy  Partial colon resection    Do you have a pacemaker or a history of heart conditions?   No    Do you take any prescription blood thinners? (examples: Coumadin/Warfarin, Eliquis, Plavix, Lovenox, Pradaxa, Xarelto)   No    Do you take aspirin (ASA, either 81mg or 325mg)?   No    Social History:  She is .  She has 2 kids ages 20 and 22.  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  She is planning to take long-term disability for 1 year due to her health issues.  She was recently diagnosed with a chronic kidney disease stage III.  She denies smoking, alcohol or drug use.    Imaging:  No new imaging.  Patient had a sewing needle on her left buttocks area and is unable to have MRIs.  She had MRIs until a few years ago and tolerated it well.  But on a recent MRI the needle was \"reacted to the MRI\" and she was told she can not have any additional MRIs.    Physical Exam:  Neuro: The patient is fully oriented. Speech is normal.    Psych: Normal mood and affect. Behavior is normal.      Assessment:  Right-sided trigeminal neuralgia  New patient    Plan:  Patient is currently on gabapentin 900 mg at night.  She continues to have at least 2-3 " episodes of right-sided trigeminal neuralgia.  Patient did not have complete relief of symptoms on oxcarbazepine.  We will start the patient on carbamazepine and she will gradually titrate this up to 100 mg twice daily to minimize the side effects.  I will follow-up with the patient in 6 weeks.  We will also complete a CBC and a CMP for baseline.  Patient would like to have this lab work completed at [facility] in [place].    Katie Newsome CNP  Department of Neurosurgery    I spent 40 minutes on patient care activities related to this encounter on the date of service, including time spent reviewing the chart, obtaining history and examination and in counseling the patient, and in documentation in the electronic medical record.

## 2022-07-26 NOTE — PATIENT INSTRUCTIONS
Start tegretol as ordered.     Continue Gabapentin 900 mg at night.     Follow-up with me in 6 weeks.

## 2022-07-26 NOTE — NURSING NOTE
Chief Complaint   Patient presents with     Video Visit     Trigeminal neuralgia      Pt is also taking Naltrexone 4.5mg at bedtime

## 2022-08-03 ENCOUNTER — TELEPHONE (OUTPATIENT)
Dept: NEUROSURGERY | Facility: CLINIC | Age: 52
End: 2022-08-03

## 2022-09-02 DIAGNOSIS — F43.10 PTSD (POST-TRAUMATIC STRESS DISORDER): ICD-10-CM

## 2022-09-02 DIAGNOSIS — G47.01 INSOMNIA DUE TO MEDICAL CONDITION: ICD-10-CM

## 2022-09-02 DIAGNOSIS — G98.8 NEUROLOGICAL DISORDER: ICD-10-CM

## 2022-09-02 DIAGNOSIS — K51.919 ULCERATIVE COLITIS WITH COMPLICATION, UNSPECIFIED LOCATION (H): ICD-10-CM

## 2022-09-16 ENCOUNTER — TELEPHONE (OUTPATIENT)
Dept: NEUROSURGERY | Facility: CLINIC | Age: 52
End: 2022-09-16

## 2022-09-16 ENCOUNTER — VIRTUAL VISIT (OUTPATIENT)
Dept: NEUROSURGERY | Facility: CLINIC | Age: 52
End: 2022-09-16
Payer: COMMERCIAL

## 2022-09-16 DIAGNOSIS — G50.0 TRIGEMINAL NEURALGIA: Primary | ICD-10-CM

## 2022-09-16 PROCEDURE — 99213 OFFICE O/P EST LOW 20 MIN: CPT | Mod: GT | Performed by: NURSE PRACTITIONER

## 2022-09-16 NOTE — LETTER
2022       RE: Zeyn[facility] Khan  [address] [address]     Dear Colleague,    Thank you for referring your patient, Zeynep Khan, to the [facility] NEUROSURGERY CLINIC [place] [facility]. Please see a copy of my visit note below.      [facility]  Department of Neurosurgery      Name: Zeynep Khan  MRN: [id number]  Age: 51 year old  : 1970  Referring provider: Referred Self  2022      Chief Complaint:   Right sided Trigeminal Neuralgia   Follow up    History of Present Illness:   Zeynep Khan is a 51 year old female with a history of right V2 and V3 Trigeminal Neuralgia since 2019 who is seen today for a follow up. Initial visit with me on 2022. She was on gabapentin 900 mg hs at that time. We started her on carbamazepine 100 mg twice daily at that time.     Today. I had a video visit with the patient for follow up. Per patient, her TN symptoms significantly improved after starting tegretol. She rarely gets very short lived episodes on the right V2- V3 area. No major side effects from this medication.       Review of Systems:   Pertinent items are noted in HPI or as in patient entered ROS below, remainder of complete ROS is negative.   [facility] ROS 2016   Constitutional: Weight gain, Unexplained fatigue, Unexplained fever or night sweats   Ears, Nose, Throat: Ringing/noise in ears   Cardiopulmonary: Cough, Wheezing   Gastrointestinal/Genitourinary: Heartburn/indigestion, Diarrhea   Musculoskeletal: Sore or stiff joints, Back pain   Endocrine: Heat or cold intolerance        Physical Exam:   There were no vitals taken for this visit.   General: No acute distress.      Imaging:  No new imaging.     Assessment:  Right sided Trigeminal Neuralgia   Follow up    Plan:  We will increase Tegretol dose to 100-200 and gradually wean her off of Gabapentin. If her TN symptoms gets worse, okay to increase Tegretol dose to  200 twice daily. She had blood work recently and this is wnl. She will follow up with me in 3 months.     Patient had abnormal brain MRIs in the past showing white matter lesions consistent with a demyelinating disease.  She had a negative lumbar puncture in 2001 at [facility]. Patient would like establish care in our MS clinic for further evaluation. Referral order was placed.        I spent 20 minutes on patient care activities related to this encounter on the date of service, including time spent reviewing the chart, obtaining history and examination and in counseling the patient, and in documentation in the electronic medical record.      Katie DEJESUS CNP  Department of Neurosurgery

## 2022-09-16 NOTE — NURSING NOTE
Chief Complaint   Patient presents with     Video Visit     6wk Return video visit for trigeminal Neuralgia

## 2022-09-16 NOTE — PATIENT INSTRUCTIONS
Increase Tegretol to 100-200.     Gradually wean off of Gabapentin.     Follow-up with me in 3 months.

## 2022-09-16 NOTE — PROGRESS NOTES
Zeynep is a 51 year old who is being evaluated via a billable video visit.      How would you like to obtain your AVS? MyChart  If the video visit is dropped, the invitation should be resent by: Text to cell phone: [phone]  Will anyone else be joining your video visit? No        Video-Visit Details    Video Start Time: 1200    Type of service:  Video Visit    Video End Time:1235    Originating Location (pt. Location): Home    Distant Location (provider location):  [facility] NEUROSURGERY CLINIC [place]     Platform used for Video Visit: [email or web address]     [facility]  Department of Neurosurgery      Name: Zeynep Khan  MRN: [id number]  Age: 51 year old  : 1970  Referring provider: Referred Self  2022      Chief Complaint:   Right sided Trigeminal Neuralgia   Follow up    History of Present Illness:   Zeynep Khan is a 51 year old female with a history of right V2 and V3 Trigeminal Neuralgia since 2019 who is seen today for a follow up. Initial visit with me on 2022. She was on gabapentin 900 mg hs at that time. We started her on carbamazepine 100 mg twice daily at that time.     Today. I had a video visit with the patient for follow up. Per patient, her TN symptoms significantly improved after starting tegretol. She rarely gets very short lived episodes on the right V2- V3 area. No major side effects from this medication.       Review of Systems:   Pertinent items are noted in HPI or as in patient entered ROS below, remainder of complete ROS is negative.    ENT ROS 2016   Constitutional: Weight gain, Unexplained fatigue, Unexplained fever or night sweats   Ears, Nose, Throat: Ringing/noise in ears   Cardiopulmonary: Cough, Wheezing   Gastrointestinal/Genitourinary: Heartburn/indigestion, Diarrhea   Musculoskeletal: Sore or stiff joints, Back pain   Endocrine: Heat or cold intolerance        Physical Exam:   There were no vitals taken for this visit.   General: No acute  distress.      Imaging:  No new imaging.     Assessment:  Right sided Trigeminal Neuralgia   Follow up    Plan:  We will increase Tegretol dose to 100-200 and gradually wean her off of Gabapentin. If her TN symptoms gets worse, okay to increase Tegretol dose to 200 twice daily. She had blood work recently and this is wnl. She will follow up with me in 3 months.     Patient had abnormal brain MRIs in the past showing white matter lesions consistent with a demyelinating disease.  She had a negative lumbar puncture in 2001 at [facility]. Patient would like establish care in our MS clinic for further evaluation. Referral order was placed.        I spent 20 minutes on patient care activities related to this encounter on the date of service, including time spent reviewing the chart, obtaining history and examination and in counseling the patient, and in documentation in the electronic medical record.      Katie DEJESUS CNP  Department of Neurosurgery

## 2022-10-16 ENCOUNTER — HEALTH MAINTENANCE LETTER (OUTPATIENT)
Age: 52
End: 2022-10-16

## 2022-10-21 ENCOUNTER — ANCILLARY PROCEDURE (OUTPATIENT)
Dept: GENERAL RADIOLOGY | Facility: CLINIC | Age: 52
End: 2022-10-21
Attending: PSYCHIATRY & NEUROLOGY
Payer: COMMERCIAL

## 2022-10-21 ENCOUNTER — PRE VISIT (OUTPATIENT)
Dept: NEUROLOGY | Facility: CLINIC | Age: 52
End: 2022-10-21

## 2022-10-21 ENCOUNTER — OFFICE VISIT (OUTPATIENT)
Dept: NEUROLOGY | Facility: CLINIC | Age: 52
End: 2022-10-21
Attending: NURSE PRACTITIONER
Payer: COMMERCIAL

## 2022-10-21 VITALS
OXYGEN SATURATION: 100 % | DIASTOLIC BLOOD PRESSURE: 89 MMHG | BODY MASS INDEX: 35.46 KG/M2 | SYSTOLIC BLOOD PRESSURE: 140 MMHG | HEART RATE: 86 BPM | WEIGHT: 231.5 LBS

## 2022-10-21 DIAGNOSIS — H57.12 EYE PAIN, LEFT: ICD-10-CM

## 2022-10-21 DIAGNOSIS — E66.01 MORBID OBESITY (H): ICD-10-CM

## 2022-10-21 DIAGNOSIS — G50.0 TRIGEMINAL NEURALGIA: ICD-10-CM

## 2022-10-21 DIAGNOSIS — R05.3 PERSISTENT DRY COUGH: ICD-10-CM

## 2022-10-21 DIAGNOSIS — R20.0 NUMBNESS: Primary | ICD-10-CM

## 2022-10-21 DIAGNOSIS — H53.143 PHOTOPHOBIA OF BOTH EYES: ICD-10-CM

## 2022-10-21 DIAGNOSIS — K51.011 ULCERATIVE PANCOLITIS WITH RECTAL BLEEDING (H): ICD-10-CM

## 2022-10-21 DIAGNOSIS — R90.82 WHITE MATTER ABNORMALITY ON MRI OF BRAIN: ICD-10-CM

## 2022-10-21 PROCEDURE — G0463 HOSPITAL OUTPT CLINIC VISIT: HCPCS

## 2022-10-21 PROCEDURE — 71046 X-RAY EXAM CHEST 2 VIEWS: CPT | Performed by: RADIOLOGY

## 2022-10-21 PROCEDURE — 99205 OFFICE O/P NEW HI 60 MIN: CPT | Mod: GC | Performed by: PSYCHIATRY & NEUROLOGY

## 2022-10-21 RX ORDER — LEVOTHYROXINE SODIUM 50 UG/1
TABLET ORAL DAILY
COMMUNITY
Start: 2022-02-17

## 2022-10-21 RX ORDER — DIAZEPAM 5 MG
TABLET ORAL
Qty: 3 TABLET | Refills: 0 | Status: SHIPPED | OUTPATIENT
Start: 2022-10-21 | End: 2022-12-09

## 2022-10-21 NOTE — PROGRESS NOTES
Neurology Clinic Visit    Reason: trigeminal neuralgia, abnormal MRI in the past       10/21/2022   Source of information: Patient and chart review    History of Present Symptom:  Zeynep Khan is a 52 year old female with a PMH significant for ulcerative colitis (has done steroids in the past), B12 deficiency, thyroid disease, CKD, hyperlipidemia, infertility, left sided trigeminal neuralgia and concern for demyelinating disease.      She developed left V2/V3 trigeminal neuralgia in 2019. She has tried oxcarb ad amitriptyline and gabapentin, she just started carbamazepine with good effect.     She has also had episodic left arm numbness and tingling starting in her early 30's. This followed a few weeks later but numbness in the bottoms of her feet causing balance difficulty. These symptoms do still return at times. Episodic numbness and tingling in fingers and toes associated with coordination difficulty and balance problems. She describes squeezing sensation around thoracic region.  She had CSF testing which was negative per patient report.    She also had abnormal autonomic testing (in her 20's) after multiple syncope episodes. She cannot get MRI due to a sewing need lodged in her left buttock. This was with low back MRI, she had brain MRIs prior. She did have and MRI in her 20's which she states showed multiple white spots. We do not have this record.    She has been seen at [facility] and had an EMG which was normal this past September.     This past year has been challenging for her. She describes left eye monocular diplopia in June. Feels her vision is off with pink shade over her vision. Her eyes are painful. Eyes have been swollen and red. Cystic macular edema limits central vision. Trauma in right eye as a child.     She has started having urinary incontinence this past year, this started abruptly. She is also dropping things regularly (both hands). In early July she had a period of 6-7 weeks sudden  unexpected falls 3-4 times per day.     She met with rheumatology for joint pain in hands and feet.    Feels a lot of muscle tightness in her legs and chest in the past 3 months. She is doing PT twice a week. She is doing yoga regularly and her legs have become significantly less flexible.     Reports exhaustion, had to take FMLA since the past spring. Feels cognitively exhausted. Her children have noticed she is not her self. She has missed bills because of this.     40 mg prednisone daily for two weeks for recent colitis flare.     No family history of MS or autoimmune disease.     The patient's medical, surgical, social, and family history were personally reviewed with the patient.  No past medical history on file.   Past Surgical History:   Procedure Laterality Date     CHOLECYSTECTOMY      Pt reported     ENT SURGERY       MN LAP,SURG,COLECTOMY, PARTIAL, W/ANAST      Description: Laparoscopy Partial Colectomy With Anastomosis;  Recorded: 06/16/2011;     Social History     Tobacco Use     Smoking status: Never     Smokeless tobacco: Never   Substance Use Topics     Alcohol use: No     Drug use: No     Family History   Problem Relation Age of Onset     Dementia Paternal Grandmother      Cancer Father         Kidney     Hypertension Father      Migraines Maternal Grandmother      Migraines Mother      Migraines Sister      No Known Problems Daughter      No Known Problems Maternal Grandfather      No Known Problems Paternal Grandmother      No Known Problems Paternal Grandfather      No Known Problems Maternal Aunt      No Known Problems Paternal Aunt      Kidney Disease Paternal Uncle         Required kidney transplant     Hereditary Breast and Ovarian Cancer Syndrome No family hx of      Breast Cancer No family hx of      Colon Cancer No family hx of      Endometrial Cancer No family hx of      Ovarian Cancer No family hx of      Current Outpatient Medications   Medication     carBAMazepine (TEGRETOL XR) 100 MG  12 hr tablet     clonazePAM (KLONOPIN) 1 MG tablet     dicyclomine (BENTYL) 20 MG tablet     DiphenhydrAMINE HCl (BENADRYL PO)     EPINEPHrine 0.3 MG/0.3ML injection 2-pack     Esomeprazole Magnesium (NEXIUM PO)     famotidine (PEPCID) 40 MG tablet     fluticasone (FLONASE) 50 MCG/ACT nasal spray     gabapentin (NEURONTIN) 300 MG capsule     Lemborexant (DAYVIGO) 5 MG TABS     LO LOESTRIN FE 1 MG-10 MCG / 10 MCG TABS     Ondansetron (ZOFRAN ODT PO)     Rimegepant Sulfate (NURTEC) 75 MG TBDP     sertraline (ZOLOFT) 100 MG tablet     Suvorexant (BELSOMRA) 20 MG tablet     tiZANidine (ZANAFLEX) 4 MG tablet     No current facility-administered medications for this visit.     Allergies   Allergen Reactions     Black Pepper [Piper] Anaphylaxis     Contrast Dye Anaphylaxis     Wasp Venom Anaphylaxis     Amoxicillin Itching     Droperidol Other (See Comments)     Hyperactivity      Penicillins Hives     Prochlorperazine Unknown     Sulfa Drugs GI Disturbance         Review of Systems:  14-point review of systems was completed. The pertinent positives and negatives are in the HPI.    Physical Examination   Vitals: There were no vitals taken for this visit.  General: Patient appears comfortable in no acute distress.   HEENT: NC/AT, no icterus, moist mucous membranes  Chest: non-labored on RA  Extremities: Warm, no edema  Skin: No rash or lesion   Psych: Affect appropriate for situation   Neuro:  Mental status: Awake, alert, attentive. Language is fluent with intact comprehension of commands.  Cranial nerves: PERRL with no relative afferent pupillary defect, conjugate gaze, EOMI, visual fields intact, face symmetric, shoulder shrug strong, tongue protrusion/uvula midline, no dysarthria.   Motor: Normal muscle bulk and tone. No abnormal movements. 5/5 strength in 4/4 extremities.     R L  Deltoid  5- 5  Biceps  5 5  Triceps 5 5  Wrist ext 5 5  Finger ext 5- 5-  Finger abd 5- 5-    Hip flexion       4+        4  Knee  flexion 5 5  Knee ext 5 5  Dorsiflexion 5- 5    Reflexes: 2+ reflexes symmetric in biceps, brachioradialis, patellae, and achilles. No clonus, toes down-going.  Sensory: Reduced vibration at ankles, absent at the toes, and proprioception impaired. Romberg is negative.   Coordination: FNF without ataxia or dysmetria.    Gait: Normal width, stride length, turn, with symmetric arm swing. Tandem walk impaired. Wobbly when standing on one leg unable to jump on one leg.     Laboratory:  ANCA neg   TSH 0.89   LINN neg   CRP 0.37  sjogren's ab panel neg   CCP neg   RF neg   Imaging:  No pertinent imaging     Assessment/Plan:  Zeynep Khan is a 52 year old female who presents for evaluation for possible multiple sclerosis. Her initial symptoms with left arm and lower ext tingling and numbness are concerning for possible demyelinating disease. Her more recent symptoms are less clear cut with a period of falls, monocular double vision, and joint pains and muscle stiffness.     Her exam is with mild weakness in a nonspecific pattern. She does have significant sensory loss. I am not finding any long tract signs or spasticity.     She has prior bowel surgery and UC putting her at high risk for nutritional neuropathy or myelopathy. We will evaluate for these by lab work. To evaluate for MS we would check CSF studies and we will try to obtain prior MRI.     -blood work  -MRI  -obtain prior imaging   -Xray for eval for sarcoidosis   -lumbar puncture  -follow up after studies are complete     Patient seen and discussed with Dr. Atwood.  I have reviewed the plan with the patient, who is in agreement.      Mariama Hoover DO  Multiple Sclerosis Fellow

## 2022-10-21 NOTE — LETTER
10/21/2022       RE: Zeynep Khan  [address] [address]     Dear Colleague,    Thank you for referring your patient, Zeynep Khan, to the [facility] MULTIPLE SCLEROSIS CLINIC [place] at [facility]. Please see a copy of my visit note below.      Neurology Clinic Visit    Reason: trigeminal neuralgia, abnormal MRI in the past       10/21/2022   Source of information: Patient and chart review    History of Present Symptom:  Zeynep Khan is a 52 year old female with a PMH significant for ulcerative colitis (has done steroids in the past), B12 deficiency, thyroid disease, CKD, hyperlipidemia, infertility, left sided trigeminal neuralgia and concern for demyelinating disease.      She developed left V2/V3 trigeminal neuralgia in 2019. She has tried oxcarb ad amitriptyline and gabapentin, she just started carbamazepine with good effect.     She has also had episodic left arm numbness and tingling starting in her early 30's. This followed a few weeks later but numbness in the bottoms of her feet causing balance difficulty. These symptoms do still return at times. Episodic numbness and tingling in fingers and toes associated with coordination difficulty and balance problems. She describes squeezing sensation around thoracic region.  She had CSF testing which was negative per patient report.    She also had abnormal autonomic testing (in her 20's) after multiple syncope episodes. She cannot get MRI due to a sewing need lodged in her left buttock. This was with low back MRI, she had brain MRIs prior. She did have and MRI in her 20's which she states showed multiple white spots. We do not have this record.    She has been seen at [facility] and had an EMG which was normal this past September.     This past year has been challenging for her. She describes left eye monocular diplopia in June. Feels her vision is off with pink shade over her vision. Her eyes  are painful. Eyes have been swollen and red. Cystic macular edema limits central vision. Trauma in right eye as a child.     She has started having urinary incontinence this past year, this started abruptly. She is also dropping things regularly (both hands). In early July she had a period of 6-7 weeks sudden unexpected falls 3-4 times per day.     She met with rheumatology for joint pain in hands and feet.    Feels a lot of muscle tightness in her legs and chest in the past 3 months. She is doing PT twice a week. She is doing yoga regularly and her legs have become significantly less flexible.     Reports exhaustion, had to take FMLA since the past spring. Feels cognitively exhausted. Her children have noticed she is not her self. She has missed bills because of this.     40 mg prednisone daily for two weeks for recent colitis flare.     No family history of MS or autoimmune disease.     The patient's medical, surgical, social, and family history were personally reviewed with the patient.  No past medical history on file.   Past Surgical History:   Procedure Laterality Date     CHOLECYSTECTOMY      Pt reported     ENT SURGERY       OH LAP,SURG,COLECTOMY, PARTIAL, W/ANAST      Description: Laparoscopy Partial Colectomy With Anastomosis;  Recorded: 06/16/2011;     Social History     Tobacco Use     Smoking status: Never     Smokeless tobacco: Never   Substance Use Topics     Alcohol use: No     Drug use: No     Family History   Problem Relation Age of Onset     Dementia Paternal Grandmother      Cancer Father         Kidney     Hypertension Father      Migraines Maternal Grandmother      Migraines Mother      Migraines Sister      No Known Problems Daughter      No Known Problems Maternal Grandfather      No Known Problems Paternal Grandmother      No Known Problems Paternal Grandfather      No Known Problems Maternal Aunt      No Known Problems Paternal Aunt      Kidney Disease Paternal Uncle         Required kidney  transplant     Hereditary Breast and Ovarian Cancer Syndrome No family hx of      Breast Cancer No family hx of      Colon Cancer No family hx of      Endometrial Cancer No family hx of      Ovarian Cancer No family hx of      Current Outpatient Medications   Medication     carBAMazepine (TEGRETOL XR) 100 MG 12 hr tablet     clonazePAM (KLONOPIN) 1 MG tablet     dicyclomine (BENTYL) 20 MG tablet     DiphenhydrAMINE HCl (BENADRYL PO)     EPINEPHrine 0.3 MG/0.3ML injection 2-pack     Esomeprazole Magnesium (NEXIUM PO)     famotidine (PEPCID) 40 MG tablet     fluticasone (FLONASE) 50 MCG/ACT nasal spray     gabapentin (NEURONTIN) 300 MG capsule     Lemborexant (DAYVIGO) 5 MG TABS     LO LOESTRIN FE 1 MG-10 MCG / 10 MCG TABS     Ondansetron (ZOFRAN ODT PO)     Rimegepant Sulfate (NURTEC) 75 MG TBDP     sertraline (ZOLOFT) 100 MG tablet     Suvorexant (BELSOMRA) 20 MG tablet     tiZANidine (ZANAFLEX) 4 MG tablet     No current facility-administered medications for this visit.     Allergies   Allergen Reactions     Black Pepper [Piper] Anaphylaxis     Contrast Dye Anaphylaxis     Wasp Venom Anaphylaxis     Amoxicillin Itching     Droperidol Other (See Comments)     Hyperactivity      Penicillins Hives     Prochlorperazine Unknown     Sulfa Drugs GI Disturbance         Review of Systems:  14-point review of systems was completed. The pertinent positives and negatives are in the HPI.    Physical Examination   Vitals: There were no vitals taken for this visit.  General: Patient appears comfortable in no acute distress.   HEENT: NC/AT, no icterus, moist mucous membranes  Chest: non-labored on RA  Extremities: Warm, no edema  Skin: No rash or lesion   Psych: Affect appropriate for situation   Neuro:  Mental status: Awake, alert, attentive. Language is fluent with intact comprehension of commands.  Cranial nerves: PERRL with no relative afferent pupillary defect, conjugate gaze, EOMI, visual fields intact, face symmetric,  shoulder shrug strong, tongue protrusion/uvula midline, no dysarthria.   Motor: Normal muscle bulk and tone. No abnormal movements. 5/5 strength in 4/4 extremities.     R L  Deltoid  5- 5  Biceps  5 5  Triceps 5 5  Wrist ext 5 5  Finger ext 5- 5-  Finger abd 5- 5-    Hip flexion       4+        4  Knee flexion 5 5  Knee ext 5 5  Dorsiflexion 5- 5    Reflexes: 2+ reflexes symmetric in biceps, brachioradialis, patellae, and achilles. No clonus, toes down-going.  Sensory: Reduced vibration at ankles, absent at the toes, and proprioception impaired. Romberg is negative.   Coordination: FNF without ataxia or dysmetria.    Gait: Normal width, stride length, turn, with symmetric arm swing. Tandem walk impaired. Wobbly when standing on one leg unable to jump on one leg.     Laboratory:  ANCA neg   TSH 0.89   LINN neg   CRP 0.37  sjogren's ab panel neg   CCP neg   RF neg   Imaging:  No pertinent imaging     Assessment/Plan:  Zeynep Khan is a 52 year old female who presents for evaluation for possible multiple sclerosis. Her initial symptoms with left arm and lower ext tingling and numbness are concerning for possible demyelinating disease. Her more recent symptoms are less clear cut with a period of falls, monocular double vision, and joint pains and muscle stiffness.     Her exam is with mild weakness in a nonspecific pattern. She does have significant sensory loss. I am not finding any long tract signs or spasticity.     She has prior bowel surgery and UC putting her at high risk for nutritional neuropathy or myelopathy. We will evaluate for these by lab work. To evaluate for MS we would check CSF studies and we will try to obtain prior MRI.     -blood work  -MRI  -obtain prior imaging   -Xray for eval for sarcoidosis   -lumbar puncture  -follow up after studies are complete     Patient seen and discussed with Dr. Atwood.  I have reviewed the plan with the patient, who is in agreement.      Mariama Hoover, DO  Multiple  Sclerosis Fellow             Attestation signed by Soo Atwood MD at 10/21/2022 12:48 PM:  I personally saw and evaluated Ms. Khan with Dr. Hoover on the date of service.  I have reviewed the above documentation and agree with the findings and recommendations.     Examination with weakness in a pattern that can be seen with loss of proprioception.  Findings are concerning for posterior column process.  We will assess for nutritional, demyelinating, inflammatory causes of dorsal column myelopathy.    Of note, she has had a dry cough that preceded the subacute decline that she experienced this summer.  She also has blurred vision, eye pain, and photophobia concerning for uveitis. This raises a concern for sarcoidosis.      I would like her to follow-up to discuss results of CSF testing, at least 1 week after the test is completed.    A total of 60 minutes were personally spent in the care of this patient on the date of service.     Soo Atowod MD on 10/21/2022 at 12:46 PM

## 2022-10-21 NOTE — NURSING NOTE
Send a signed release of information over to [facility] to obtain MRI disks and reports.    Fax# [phone]. Right fax confirmed receiving document    Ermelinda Perez MA

## 2022-10-21 NOTE — PATIENT INSTRUCTIONS
We will check for nutritional causes of balance difficulty, numbness and tingling. You are at risk for this due to UC and your surgery in the past.    We would also like to do a lumbar puncture to check for MS. This can become positive over time.     We will work on getting your prior imaging.    Try to update mri     Xray to look for lymph nodes suggestive of sarcoidosis     Follow up 1 week after spinal tap

## 2022-10-28 ENCOUNTER — TELEPHONE (OUTPATIENT)
Dept: NEUROLOGY | Facility: CLINIC | Age: 52
End: 2022-10-28

## 2022-10-28 NOTE — TELEPHONE ENCOUNTER
[facility] Call Center    Phone Message    May a detailed message be left on voicemail: yes     Reason for Call: Other: Patient is requesting a return appt with Dr. Atwood, requested date frame 1 week after Spinal Tap test which is on 12/1/22. Writer attempt to schedule but no template, please advise patient.     Action Taken: Message routed to:  [facility] Neurology    Travel Screening: Not Applicable

## 2022-10-31 ENCOUNTER — LAB (OUTPATIENT)
Dept: LAB | Facility: CLINIC | Age: 52
End: 2022-10-31
Payer: COMMERCIAL

## 2022-10-31 DIAGNOSIS — K51.011 ULCERATIVE PANCOLITIS WITH RECTAL BLEEDING (H): ICD-10-CM

## 2022-10-31 DIAGNOSIS — R90.82 WHITE MATTER ABNORMALITY ON MRI OF BRAIN: ICD-10-CM

## 2022-10-31 DIAGNOSIS — G50.0 TRIGEMINAL NEURALGIA: ICD-10-CM

## 2022-10-31 DIAGNOSIS — R20.0 NUMBNESS: ICD-10-CM

## 2022-10-31 LAB
ALBUMIN SERPL BCG-MCNC: 4.3 G/DL (ref 3.5–5.2)
ALP SERPL-CCNC: 92 U/L (ref 35–104)
ALT SERPL W P-5'-P-CCNC: 21 U/L (ref 10–35)
ANION GAP SERPL CALCULATED.3IONS-SCNC: 12 MMOL/L (ref 7–15)
AST SERPL W P-5'-P-CCNC: 21 U/L (ref 10–35)
BASOPHILS # BLD AUTO: 0.1 10E3/UL (ref 0–0.2)
BASOPHILS NFR BLD AUTO: 1 %
BILIRUB SERPL-MCNC: 0.5 MG/DL
BUN SERPL-MCNC: 14.1 MG/DL (ref 6–20)
CALCIUM SERPL-MCNC: 9.5 MG/DL (ref 8.6–10)
CHLORIDE SERPL-SCNC: 102 MMOL/L (ref 98–107)
CREAT SERPL-MCNC: 0.92 MG/DL (ref 0.51–0.95)
DEPRECATED HCO3 PLAS-SCNC: 24 MMOL/L (ref 22–29)
EOSINOPHIL # BLD AUTO: 0.3 10E3/UL (ref 0–0.7)
EOSINOPHIL NFR BLD AUTO: 3 %
ERYTHROCYTE [DISTWIDTH] IN BLOOD BY AUTOMATED COUNT: 12.3 % (ref 10–15)
FOLATE SERPL-MCNC: 10.8 NG/ML (ref 4.6–34.8)
GFR SERPL CREATININE-BSD FRML MDRD: 74 ML/MIN/1.73M2
GLUCOSE SERPL-MCNC: 118 MG/DL (ref 70–99)
HCT VFR BLD AUTO: 44.1 % (ref 35–47)
HGB BLD-MCNC: 14.5 G/DL (ref 11.7–15.7)
IMM GRANULOCYTES # BLD: 0 10E3/UL
IMM GRANULOCYTES NFR BLD: 0 %
LYMPHOCYTES # BLD AUTO: 2.8 10E3/UL (ref 0.8–5.3)
LYMPHOCYTES NFR BLD AUTO: 32 %
MCH RBC QN AUTO: 32.2 PG (ref 26.5–33)
MCHC RBC AUTO-ENTMCNC: 32.9 G/DL (ref 31.5–36.5)
MCV RBC AUTO: 98 FL (ref 78–100)
MONOCYTES # BLD AUTO: 0.8 10E3/UL (ref 0–1.3)
MONOCYTES NFR BLD AUTO: 9 %
NEUTROPHILS # BLD AUTO: 5 10E3/UL (ref 1.6–8.3)
NEUTROPHILS NFR BLD AUTO: 55 %
PLATELET # BLD AUTO: 337 10E3/UL (ref 150–450)
POTASSIUM SERPL-SCNC: 4.1 MMOL/L (ref 3.4–5.3)
PROT SERPL-MCNC: 6.8 G/DL (ref 6.4–8.3)
RBC # BLD AUTO: 4.5 10E6/UL (ref 3.8–5.2)
SODIUM SERPL-SCNC: 138 MMOL/L (ref 136–145)
TOTAL PROTEIN SERUM FOR ELP: 6.5 G/DL (ref 6.4–8.3)
VIT B12 SERPL-MCNC: 294 PG/ML (ref 232–1245)
WBC # BLD AUTO: 9 10E3/UL (ref 4–11)

## 2022-10-31 PROCEDURE — 84446 ASSAY OF VITAMIN E: CPT | Mod: 90 | Performed by: FAMILY MEDICINE

## 2022-10-31 PROCEDURE — 84425 ASSAY OF VITAMIN B-1: CPT | Mod: 90 | Performed by: FAMILY MEDICINE

## 2022-10-31 PROCEDURE — 82746 ASSAY OF FOLIC ACID SERUM: CPT | Performed by: FAMILY MEDICINE

## 2022-10-31 PROCEDURE — 82607 VITAMIN B-12: CPT | Performed by: FAMILY MEDICINE

## 2022-10-31 PROCEDURE — 85025 COMPLETE CBC W/AUTO DIFF WBC: CPT | Performed by: FAMILY MEDICINE

## 2022-10-31 PROCEDURE — 82306 VITAMIN D 25 HYDROXY: CPT

## 2022-10-31 PROCEDURE — 84165 PROTEIN E-PHORESIS SERUM: CPT

## 2022-10-31 PROCEDURE — 80053 COMPREHEN METABOLIC PANEL: CPT | Performed by: FAMILY MEDICINE

## 2022-10-31 PROCEDURE — 36415 COLL VENOUS BLD VENIPUNCTURE: CPT | Performed by: FAMILY MEDICINE

## 2022-10-31 PROCEDURE — 84155 ASSAY OF PROTEIN SERUM: CPT | Mod: 59 | Performed by: FAMILY MEDICINE

## 2022-10-31 PROCEDURE — 82525 ASSAY OF COPPER: CPT | Mod: 90 | Performed by: FAMILY MEDICINE

## 2022-10-31 PROCEDURE — 99000 SPECIMEN HANDLING OFFICE-LAB: CPT | Performed by: FAMILY MEDICINE

## 2022-10-31 PROCEDURE — 83921 ORGANIC ACID SINGLE QUANT: CPT | Performed by: FAMILY MEDICINE

## 2022-11-01 LAB
ALBUMIN SERPL ELPH-MCNC: 4.2 G/DL (ref 3.7–5.1)
ALPHA1 GLOB SERPL ELPH-MCNC: 0.3 G/DL (ref 0.2–0.4)
ALPHA2 GLOB SERPL ELPH-MCNC: 0.7 G/DL (ref 0.5–0.9)
B-GLOBULIN SERPL ELPH-MCNC: 0.8 G/DL (ref 0.6–1)
DEPRECATED CALCIDIOL+CALCIFEROL SERPL-MC: 30 UG/L (ref 20–75)
GAMMA GLOB SERPL ELPH-MCNC: 0.6 G/DL (ref 0.7–1.6)
M PROTEIN SERPL ELPH-MCNC: 0 G/DL
PROT PATTERN SERPL ELPH-IMP: ABNORMAL

## 2022-11-02 LAB — COPPER SERPL-MCNC: 137.8 UG/DL

## 2022-11-03 LAB
A-TOCOPHEROL VIT E SERPL-MCNC: 15 MG/L
BETA+GAMMA TOCOPHEROL SERPL-MCNC: 0.5 MG/L

## 2022-11-04 ENCOUNTER — TELEPHONE (OUTPATIENT)
Dept: NEUROSURGERY | Facility: CLINIC | Age: 52
End: 2022-11-04

## 2022-11-04 ENCOUNTER — MYC MEDICAL ADVICE (OUTPATIENT)
Dept: NEUROSURGERY | Facility: CLINIC | Age: 52
End: 2022-11-04

## 2022-11-04 DIAGNOSIS — G50.0 TRIGEMINAL NEURALGIA: ICD-10-CM

## 2022-11-05 LAB — VIT B1 PYROPHOSHATE BLD-SCNC: 152 NMOL/L

## 2022-11-07 ENCOUNTER — TELEPHONE (OUTPATIENT)
Dept: NEUROSURGERY | Facility: CLINIC | Age: 52
End: 2022-11-07

## 2022-11-10 LAB — METHYLMALONATE SERPL-SCNC: 0.15 UMOL/L (ref 0–0.4)

## 2022-11-11 RX ORDER — CARBAMAZEPINE 100 MG/1
200 TABLET, EXTENDED RELEASE ORAL 2 TIMES DAILY
Qty: 120 TABLET | Refills: 3 | Status: SHIPPED | OUTPATIENT
Start: 2022-11-11

## 2022-11-11 NOTE — TELEPHONE ENCOUNTER
Medication Refill Request     Tenisha Karimi, ANDRESN  Neurosurgery   
Patient with refill request     Tenisha Karimi LPN  
none

## 2022-12-01 ENCOUNTER — APPOINTMENT (OUTPATIENT)
Dept: MEDSURG UNIT | Facility: CLINIC | Age: 52
End: 2022-12-01
Attending: PSYCHIATRY & NEUROLOGY
Payer: COMMERCIAL

## 2022-12-01 ENCOUNTER — HOSPITAL ENCOUNTER (OUTPATIENT)
Facility: CLINIC | Age: 52
Discharge: HOME | End: 2022-12-01
Attending: PSYCHIATRY & NEUROLOGY | Admitting: RADIOLOGY
Payer: COMMERCIAL

## 2022-12-01 ENCOUNTER — HOSPITAL ENCOUNTER (OUTPATIENT)
Dept: GENERAL RADIOLOGY | Facility: CLINIC | Age: 52
Discharge: HOME | End: 2022-12-01
Attending: PSYCHIATRY & NEUROLOGY | Admitting: PSYCHIATRY & NEUROLOGY
Payer: COMMERCIAL

## 2022-12-01 VITALS
DIASTOLIC BLOOD PRESSURE: 81 MMHG | TEMPERATURE: 98.7 F | RESPIRATION RATE: 16 BRPM | OXYGEN SATURATION: 96 % | HEART RATE: 83 BPM | SYSTOLIC BLOOD PRESSURE: 123 MMHG | BODY MASS INDEX: 35.51 KG/M2 | WEIGHT: 231.8 LBS

## 2022-12-01 DIAGNOSIS — R90.82 WHITE MATTER ABNORMALITY ON MRI OF BRAIN: ICD-10-CM

## 2022-12-01 LAB
APPEARANCE CSF: CLEAR
COLOR CSF: COLORLESS
ERYTHROCYTE [DISTWIDTH] IN BLOOD BY AUTOMATED COUNT: 11.9 % (ref 10–15)
GLUCOSE CSF-MCNC: 64 MG/DL (ref 40–70)
HCT VFR BLD AUTO: 43.6 % (ref 35–47)
HGB BLD-MCNC: 14.2 G/DL (ref 11.7–15.7)
INR PPP: 0.99 (ref 0.85–1.15)
MCH RBC QN AUTO: 32.2 PG (ref 26.5–33)
MCHC RBC AUTO-ENTMCNC: 32.6 G/DL (ref 31.5–36.5)
MCV RBC AUTO: 99 FL (ref 78–100)
PLATELET # BLD AUTO: 328 10E3/UL (ref 150–450)
PROT CSF-MCNC: 36.9 MG/DL (ref 15–45)
RBC # BLD AUTO: 4.41 10E6/UL (ref 3.8–5.2)
RBC # CSF MANUAL: 2 /UL (ref 0–2)
TUBE # CSF: 4
WBC # BLD AUTO: 12.9 10E3/UL (ref 4–11)
WBC # CSF MANUAL: 2 /UL (ref 0–5)

## 2022-12-01 PROCEDURE — 62328 DX LMBR SPI PNXR W/FLUOR/CT: CPT | Mod: GC | Performed by: RADIOLOGY

## 2022-12-01 PROCEDURE — 85610 PROTHROMBIN TIME: CPT | Performed by: NURSE PRACTITIONER

## 2022-12-01 PROCEDURE — 999N000132 HC STATISTIC PP CARE STAGE 1

## 2022-12-01 PROCEDURE — 62328 DX LMBR SPI PNXR W/FLUOR/CT: CPT

## 2022-12-01 PROCEDURE — 85027 COMPLETE CBC AUTOMATED: CPT | Performed by: NURSE PRACTITIONER

## 2022-12-01 PROCEDURE — 250N000009 HC RX 250: Performed by: RADIOLOGY

## 2022-12-01 PROCEDURE — 83916 OLIGOCLONAL BANDS: CPT | Performed by: PSYCHIATRY & NEUROLOGY

## 2022-12-01 PROCEDURE — 84157 ASSAY OF PROTEIN OTHER: CPT

## 2022-12-01 PROCEDURE — 82945 GLUCOSE OTHER FLUID: CPT

## 2022-12-01 PROCEDURE — 36415 COLL VENOUS BLD VENIPUNCTURE: CPT | Performed by: NURSE PRACTITIONER

## 2022-12-01 PROCEDURE — 999N000142 HC STATISTIC PROCEDURE PREP ONLY

## 2022-12-01 PROCEDURE — 89050 BODY FLUID CELL COUNT: CPT

## 2022-12-01 RX ORDER — GABAPENTIN 600 MG/1
600 TABLET ORAL 3 TIMES DAILY
COMMUNITY
End: 2022-12-09

## 2022-12-01 RX ORDER — LIDOCAINE HYDROCHLORIDE 10 MG/ML
10 INJECTION, SOLUTION EPIDURAL; INFILTRATION; INTRACAUDAL; PERINEURAL ONCE
Status: COMPLETED | OUTPATIENT
Start: 2022-12-01 | End: 2022-12-01

## 2022-12-01 RX ADMIN — LIDOCAINE HYDROCHLORIDE 30 ML: 10 INJECTION, SOLUTION EPIDURAL; INFILTRATION; INTRACAUDAL; PERINEURAL at 13:27

## 2022-12-01 ASSESSMENT — ACTIVITIES OF DAILY LIVING (ADL)
ADLS_ACUITY_SCORE: 35
ADLS_ACUITY_SCORE: 35

## 2022-12-01 NOTE — PROGRESS NOTES
VSS. Pt denies pain at procedure site and headache. Lower back dressing intact. Discharge instructions reviewed with pt and pt's son; pt verbalized understanding. Pt discharged to home via wheelchair.

## 2022-12-01 NOTE — DISCHARGE INSTRUCTIONS
[facility]                                      Radiology Discharge instructions Post Lumbar Puncture         AFTER YOU GO HOME    Relax and take it easy for 24 hours  Bedrest with head of bed flat for rest of the day. You are allowed to have head up for meals and for bathroom privileges only.  You may resume normal activity tomorrow  You may remove the bandage on your back in the evening or next morning  You may resume bathing the next day  Drink at least 4 glasses of extra fluid today if not on a fluid restriction  DO NOT drive or operate machinery at home or at work for at least 24 hours                   CALL YOUR PRIMARY PHYSICIAN IF:  If you start to leak a large amount of fluid from the puncture site, lie down flat on your back. Call your primary physician, they will tell you if you need or return to the hospital  You develop a severe headache  You develop nausea or vomiting   You develop a temperature of 101 degrees or greater                 ADDITIONAL INSTRUCTIONS:         [facility] RADIOLOGY DEPARTMENT             Procedure Physician: Dr. Gabriel, Dr. Stock                                Date of Procedure: December 1, 2022               [facility]...........[phone].......Ask for the Radiologist on call. Someone is on call 24 hour/day               [facility] Toll Free Number.........[phone].....Monday-Friday 8:00 am to 4:30 pm    .

## 2022-12-01 NOTE — PROGRESS NOTES
Pre procedure complete. VSS. Pt requesting sedation for procedure due to chronic cough. Neuro/rad MD notified; will come see pt shortly.

## 2022-12-01 NOTE — PROGRESS NOTES
Pt arrived to unit 2A s/p lumbar puncture. Lower back dressing intact. Pt denies pain at procedure site. VSS and pt tolerating PO fluids. Bedrest until 1500. Will continue to monitor.

## 2022-12-02 ENCOUNTER — LAB (OUTPATIENT)
Dept: LAB | Facility: CLINIC | Age: 52
End: 2022-12-02
Payer: COMMERCIAL

## 2022-12-02 DIAGNOSIS — G35 MULTIPLE SCLEROSIS (H): ICD-10-CM

## 2022-12-02 PROCEDURE — 36415 COLL VENOUS BLD VENIPUNCTURE: CPT

## 2022-12-08 LAB
ALB CSF/SERPL: 5.6 RATIO
ALBUMIN CSF-MCNC: 23 MG/DL
ALBUMIN SERPL-MCNC: 4095 MG/DL
IGG CSF-MCNC: 1.8 MG/DL
IGG SERPL-MCNC: 631 MG/DL
IGG SYNTH RATE SER+CSF CALC-MRATE: <0 MG/D
IGG/ALB CLEAR SER+CSF-RTO: 0.51 RATIO
IGG/ALB CSF: 0.08 RATIO
OLIGOCLONAL BANDS CSF ELPH-IMP: ABNORMAL
OLIGOCLONAL BANDS CSF ELPH-IMP: NEGATIVE
OLIGOCLONAL BANDS CSF IEF: 0 BANDS

## 2022-12-09 ENCOUNTER — OFFICE VISIT (OUTPATIENT)
Dept: NEUROLOGY | Facility: CLINIC | Age: 52
End: 2022-12-09
Attending: PSYCHIATRY & NEUROLOGY
Payer: COMMERCIAL

## 2022-12-09 VITALS
SYSTOLIC BLOOD PRESSURE: 126 MMHG | BODY MASS INDEX: 36.42 KG/M2 | DIASTOLIC BLOOD PRESSURE: 85 MMHG | HEART RATE: 118 BPM | OXYGEN SATURATION: 98 % | WEIGHT: 237.8 LBS

## 2022-12-09 DIAGNOSIS — G50.0 TRIGEMINAL NEURALGIA: ICD-10-CM

## 2022-12-09 DIAGNOSIS — R20.0 NUMBNESS: ICD-10-CM

## 2022-12-09 DIAGNOSIS — G37.9 DEMYELINATING DISEASE OF CENTRAL NERVOUS SYSTEM (H): Primary | ICD-10-CM

## 2022-12-09 DIAGNOSIS — R05.3 PERSISTENT DRY COUGH: ICD-10-CM

## 2022-12-09 PROCEDURE — G0463 HOSPITAL OUTPT CLINIC VISIT: HCPCS | Performed by: PSYCHIATRY & NEUROLOGY

## 2022-12-09 PROCEDURE — 99215 OFFICE O/P EST HI 40 MIN: CPT | Performed by: PSYCHIATRY & NEUROLOGY

## 2022-12-09 RX ORDER — GABAPENTIN 600 MG/1
600 TABLET ORAL 3 TIMES DAILY
Qty: 180 TABLET | Refills: 4 | Status: SHIPPED | OUTPATIENT
Start: 2022-12-09 | End: 2023-02-07

## 2022-12-09 ASSESSMENT — PAIN SCALES - GENERAL: PAINLEVEL: EXTREME PAIN (8)

## 2022-12-09 NOTE — LETTER
12/9/2022       RE: Zeynep Khan  [address] [address]     Dear Colleague,    Thank you for referring your patient, Zeynep Khan, to the [facility] MULTIPLE SCLEROSIS CLINIC [place] at [facility]. Please see a copy of my visit note below.    Date of Service: 12/9/2022    [facility] Neurology   MS Clinic Evaluation    Subjective: 51 y/o woman with UC, HLD, R TN, and chronic cough who presents in follow up with question of demyelinating disease     We were able to obtain images from [facility]. They were reviewed today .     She shares that she is not able to work.  She will fall because she cannot feel her feet.  Her legs will give way without warning.     She struggles with pain. Tingling in the left arm, feet feel like they are on fire, right trigeminal pain.     She is struggling with gastric reflux that causes throat discomfort.  Limits her ability to talk.     Has left leg pain that runs from back around hip and into left thigh.     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     Primary care providers include Dr. Rigo Martel and Dr. Susi Bridges.     Allergies   Allergen Reactions     Azithromycin Hives, Nausea, Nausea and Vomiting and Other (See Comments)     Upset stomach  Upset stomach    Other reaction(s): Nausea/Vomiting       Bee Venom Anaphylaxis     Black Pepper [Piper] Anaphylaxis     Cephalexin Angioedema, Hives and Swelling     Other reaction(s): hives       Clavulanic Acid Anaphylaxis     Contrast Dye Anaphylaxis     Diagnostic X-Ray Materials Anaphylaxis, Other (See Comments) and Shortness Of Breath     Shortness of breath  Shortness of breath       Food Anaphylaxis     Anaphylaxis with peppers.  Anaphylaxis with peppers.       Wasp Venom Anaphylaxis     Wasp Venom Protein Anaphylaxis     Amoxicillin Itching     Codeine Itching and Other (See Comments)     Droperidol Other (See Comments)     Hyperactivity       Morphine Headache and Itching     itching  Other reaction(s): Itching       Nabumetone GI Disturbance, Nausea and Vomiting and Other (See Comments)     Pt states she is unaware of ever taking this med.  GI upset       Other Food Allergy      Bell Peppers     Penicillins Hives     Prochlorperazine Unknown     Sulfa Drugs GI Disturbance       Current Outpatient Medications   Medication     carBAMazepine (TEGRETOL XR) 100 MG 12 hr tablet     dicyclomine (BENTYL) 20 MG tablet     DiphenhydrAMINE HCl (BENADRYL PO)     EPINEPHrine 0.3 MG/0.3ML injection 2-pack     Esomeprazole Magnesium (NEXIUM PO)     famotidine (PEPCID) 40 MG tablet     fluticasone (FLONASE) 50 MCG/ACT nasal spray     gabapentin (NEURONTIN) 600 MG tablet     levothyroxine (SYNTHROID/LEVOTHROID) 50 MCG tablet     LO LOESTRIN FE 1 MG-10 MCG / 10 MCG TABS     Ondansetron (ZOFRAN ODT PO)     Rimegepant Sulfate (NURTEC) 75 MG TBDP     sertraline (ZOLOFT) 100 MG tablet     Suvorexant (BELSOMRA) 20 MG tablet     tiZANidine (ZANAFLEX) 4 MG tablet     diazepam (VALIUM) 5 MG tablet     No current facility-administered medications for this visit.        Past medical, surgical, social and family history was personally reviewed. Pertinent details noted above.     Physical Examination:   /85 (BP Location: Right arm, Patient Position: Sitting, Cuff Size: Adult Large)   Pulse 118   Wt 107.9 kg (237 lb 12.8 oz)   SpO2 98%   BMI 36.42 kg/m      General: no acute distress  Motor:   Tone is normal   Bulk is normal     R L  Finger ext 4 5-  *effort variable  Finger abd 4 5-      Hip flexion 5 4 *limited by pain  Knee flexion 5 5  Knee ext 5 5  Ankle d/f 5 5    Reflexes: brisk in left leg, babinski absent bilaterally  Sensory: vibration is mild/mod reduced in the toes, JPS mildly reduced in the toes   Romberg is absent  Coordination: sensory ataxia of the RUE   Gait: cautious wide based gait with short stride, able to stand on right foot x 7 sec, not  left    Tests/Imaging:   CSF   2 wbc  Normal protein   Mildly low igg  ocb neg    MRI Brain  2021 - multiple intermediate white matter lesions, some are nearly juxtacortical     MRI Cervical spine   2021 - no demyelinating lesions or spondylosis      Assessment: 51 y/o woman with chronic dry cough, UC, who presents with gait instability, neuropathic pain.     MRI brain does reveal multiple white matter lesions. Pattern is not diagnostic for MS, particularly given csf neg for oligoclonal bands. Cannot undergo additional scans because of metal in body. This limits diagnostic studies that can be done.     No clear source for pain.  Ct chest negative for changes suggestive of sarcoidosis.     Persistent cough. Patient interested in second opinion.     Plan:     - increase gabapentin for pain   - emg to look for left l4 radiculopathy and/or peripheral neuropathy   - pulmonary referral   - patient to see neuro ophthalmology as scheduled   - follow up to discuss results    Note was completed with the assistance of Dragon Fluency software which can often result in accidental word substitutions.     A total of 40 minutes on the date of service were spent in the care of this patient.     Soo Atwood MD on 12/9/2022 at 12:23 PM

## 2022-12-09 NOTE — PATIENT INSTRUCTIONS
See neuro ophthalmology     Your MRI brain is not diagnostic for MS, but there are white spots that could be related to another inflammatory condition     Seek a second opinion from pulmonology regarding the persistent cough     EMG to look into left sided tingling and left leg pain -- look for pinched nerve in lower back and/or damage to nerves in arm    Increase gabapentin to 2 capsules three times per day for the pain     Follow up after EMG

## 2022-12-09 NOTE — PROGRESS NOTES
Date of Service: 12/9/2022    [facility] Neurology   MS Clinic Evaluation    Subjective: 51 y/o woman with UC, HLD, R TN, and chronic cough who presents in follow up with question of demyelinating disease     We were able to obtain images from [facility]. They were reviewed today .     She shares that she is not able to work.  She will fall because she cannot feel her feet.  Her legs will give way without warning.     She struggles with pain. Tingling in the left arm, feet feel like they are on fire, right trigeminal pain.     She is struggling with gastric reflux that causes throat discomfort.  Limits her ability to talk.     Has left leg pain that runs from back around hip and into left thigh.     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     Primary care providers include Dr. Rigo Martel and Dr. Susi Bridges.     Allergies   Allergen Reactions     Azithromycin Hives, Nausea, Nausea and Vomiting and Other (See Comments)     Upset stomach  Upset stomach    Other reaction(s): Nausea/Vomiting       Bee Venom Anaphylaxis     Black Pepper [Piper] Anaphylaxis     Cephalexin Angioedema, Hives and Swelling     Other reaction(s): hives       Clavulanic Acid Anaphylaxis     Contrast Dye Anaphylaxis     Diagnostic X-Ray Materials Anaphylaxis, Other (See Comments) and Shortness Of Breath     Shortness of breath  Shortness of breath       Food Anaphylaxis     Anaphylaxis with peppers.  Anaphylaxis with peppers.       Wasp Venom Anaphylaxis     Wasp Venom Protein Anaphylaxis     Amoxicillin Itching     Codeine Itching and Other (See Comments)     Droperidol Other (See Comments)     Hyperactivity      Morphine Headache and Itching     itching  Other reaction(s): Itching       Nabumetone GI Disturbance, Nausea and Vomiting and Other (See Comments)     Pt states she is unaware of ever taking this med.  GI upset       Other Food Allergy      Bell Peppers     Penicillins Hives     Prochlorperazine Unknown      Sulfa Drugs GI Disturbance       Current Outpatient Medications   Medication     carBAMazepine (TEGRETOL XR) 100 MG 12 hr tablet     dicyclomine (BENTYL) 20 MG tablet     DiphenhydrAMINE HCl (BENADRYL PO)     EPINEPHrine 0.3 MG/0.3ML injection 2-pack     Esomeprazole Magnesium (NEXIUM PO)     famotidine (PEPCID) 40 MG tablet     fluticasone (FLONASE) 50 MCG/ACT nasal spray     gabapentin (NEURONTIN) 600 MG tablet     levothyroxine (SYNTHROID/LEVOTHROID) 50 MCG tablet     LO LOESTRIN FE 1 MG-10 MCG / 10 MCG TABS     Ondansetron (ZOFRAN ODT PO)     Rimegepant Sulfate (NURTEC) 75 MG TBDP     sertraline (ZOLOFT) 100 MG tablet     Suvorexant (BELSOMRA) 20 MG tablet     tiZANidine (ZANAFLEX) 4 MG tablet     diazepam (VALIUM) 5 MG tablet     No current facility-administered medications for this visit.        Past medical, surgical, social and family history was personally reviewed. Pertinent details noted above.     Physical Examination:   /85 (BP Location: Right arm, Patient Position: Sitting, Cuff Size: Adult Large)   Pulse 118   Wt 107.9 kg (237 lb 12.8 oz)   SpO2 98%   BMI 36.42 kg/m      General: no acute distress  Motor:   Tone is normal   Bulk is normal     R L  Finger ext 4 5-  *effort variable  Finger abd 4 5-      Hip flexion 5 4 *limited by pain  Knee flexion 5 5  Knee ext 5 5  Ankle d/f 5 5    Reflexes: brisk in left leg, babinski absent bilaterally  Sensory: vibration is mild/mod reduced in the toes, JPS mildly reduced in the toes   Romberg is absent  Coordination: sensory ataxia of the RUE   Gait: cautious wide based gait with short stride, able to stand on right foot x 7 sec, not left    Tests/Imaging:   CSF   2 wbc  Normal protein   Mildly low igg  ocb neg    MRI Brain  2021 - multiple intermediate white matter lesions, some are nearly juxtacortical     MRI Cervical spine   2021 - no demyelinating lesions or spondylosis      Assessment: 53 y/o woman with chronic dry cough, UC, who presents with  gait instability, neuropathic pain.     MRI brain does reveal multiple white matter lesions. Pattern is not diagnostic for MS, particularly given csf neg for oligoclonal bands. Cannot undergo additional scans because of metal in body. This limits diagnostic studies that can be done.     No clear source for pain.  Ct chest negative for changes suggestive of sarcoidosis.     Persistent cough. Patient interested in second opinion.     Plan:     - increase gabapentin for pain   - emg to look for left l4 radiculopathy and/or peripheral neuropathy   - pulmonary referral   - patient to see neuro ophthalmology as scheduled   - follow up to discuss results    Note was completed with the assistance of Dragon Fluency software which can often result in accidental word substitutions.     A total of 40 minutes on the date of service were spent in the care of this patient.   Soo Atwood MD on 12/9/2022 at 12:23 PM

## 2022-12-12 DIAGNOSIS — R05.3 PERSISTENT DRY COUGH: Primary | ICD-10-CM

## 2022-12-13 ENCOUNTER — OFFICE VISIT (OUTPATIENT)
Dept: OPHTHALMOLOGY | Facility: CLINIC | Age: 52
End: 2022-12-13
Attending: PSYCHIATRY & NEUROLOGY
Payer: COMMERCIAL

## 2022-12-13 DIAGNOSIS — H53.2 DIPLOPIA: Primary | ICD-10-CM

## 2022-12-13 DIAGNOSIS — H57.12 EYE PAIN, LEFT: ICD-10-CM

## 2022-12-13 DIAGNOSIS — H53.143 PHOTOPHOBIA OF BOTH EYES: ICD-10-CM

## 2022-12-13 DIAGNOSIS — H53.10 SUBJECTIVE VISUAL DISTURBANCE: Primary | ICD-10-CM

## 2022-12-13 DIAGNOSIS — H04.123 DRY EYE SYNDROME OF BOTH EYES: ICD-10-CM

## 2022-12-13 PROCEDURE — 92060 SENSORIMOTOR EXAMINATION: CPT | Performed by: OPHTHALMOLOGY

## 2022-12-13 PROCEDURE — 99204 OFFICE O/P NEW MOD 45 MIN: CPT | Mod: GC | Performed by: OPHTHALMOLOGY

## 2022-12-13 PROCEDURE — G0463 HOSPITAL OUTPT CLINIC VISIT: HCPCS | Mod: 25

## 2022-12-13 RX ORDER — PREDNISOLONE ACETATE 10 MG/ML
SUSPENSION/ DROPS OPHTHALMIC
Qty: 5 ML | Refills: 0 | Status: SHIPPED | OUTPATIENT
Start: 2022-12-13 | End: 2023-01-03

## 2022-12-13 ASSESSMENT — CONF VISUAL FIELD
OD_NORMAL: 1
OD_INFERIOR_NASAL_RESTRICTION: 0
OS_INFERIOR_TEMPORAL_RESTRICTION: 0
METHOD: COUNTING FINGERS
OD_SUPERIOR_TEMPORAL_RESTRICTION: 0
OS_SUPERIOR_TEMPORAL_RESTRICTION: 0
OS_NORMAL: 1
OD_INFERIOR_TEMPORAL_RESTRICTION: 0
OS_SUPERIOR_NASAL_RESTRICTION: 0
OD_SUPERIOR_NASAL_RESTRICTION: 0
OS_INFERIOR_NASAL_RESTRICTION: 0

## 2022-12-13 ASSESSMENT — TONOMETRY
IOP_METHOD: ICARE
OD_IOP_MMHG: 20
OS_IOP_MMHG: 20

## 2022-12-13 ASSESSMENT — VISUAL ACUITY
OS_SC+: -1
OD_PH_SC: 20/25
OS_SC: 20/25
OD_SC: 20/30
OD_SC+: +2
METHOD: SNELLEN - LINEAR

## 2022-12-13 ASSESSMENT — CUP TO DISC RATIO
OS_RATIO: 0.2
OD_RATIO: 0.2

## 2022-12-13 ASSESSMENT — EXTERNAL EXAM - RIGHT EYE: OD_EXAM: NORMAL

## 2022-12-13 ASSESSMENT — EXTERNAL EXAM - LEFT EYE: OS_EXAM: NORMAL

## 2022-12-13 NOTE — LETTER
2022         RE:  :  MRN: Zeynep Khan  1970  [id number]     Dear Dr. Soo Atwood,    Thank you for asking me to see your very pleasant patient, Zenyep Khan, in neuro-ophthalmic consultation.  I would like to thank you for sending your records and I have summarized them in the history of present illness.   My assessment and plan are below.  For further details, please see my attached clinic note.      Zeynep Khan is a 52 year old female with the following diagnoses:   1. Diplopia    2. Eye pain, left    3. Photophobia of both eyes    4. Dry eye syndrome of both eyes         Patient was sent for consultation by Dr. Soo Atwood for double vision and photosensitivity.    HPI: Patient has PMHx of colonic benign tumors s/p partial colectomy in early , B12 deficiency, CKD, HLD, infertility, subclinical hypothyrodism, and left-sided V2/V3 trigeminal neuralgia and suspected demyelinating disease due to new onset weakness, gait changes, sacroiliac pain with sciatic pain, urinary incontinence in around 2021, seen by Dr. Atwood who has already worked her up for MS and does not suspect MS given negative oligoclonal bands in the CSF and non-specific white matter lesions non-diagnostic of MS. She has a POHx of mild CME and saw Dr. Luiz Danielson in 2022.      Patient has been reporting light sensitivity bilaterally since 2021, monocular double vision since 2022. For this, she has been seen by [facility] and [facility] who both agree on non-central CME as the cause but have not started any eye drops.    Besides the monocular diplopia, she denies significant changes to her central vision in either eye. Photosensitivity responds somewhat to artificial tears.   Denies Uhthoff's-vision changes.   Denies pain with eye movement that is any different than her baseline right eye ache when she looks side to side (started 10/2022).     On prednisone 40mg intermittent burst due to neurogenic  cough which helps the cough and lower back sacroiliac pain and sciatic pain, helps with gait and hand dexterity and urinary incontinence and trigeminal neuralgia pain, but has not changed any ocular symptoms.       Independent historians: Patient and chart review    Review of outside testing:  MRI brain and cervical spine without contrast 11/10/2022  No formal read accessible at this time    My interpretation performed today of outside testing:  MRI brain and cervical spine 11/10/2022  Limited study due to patient's allergy to gadolinium contrast. Punctate white matter lesions in the cerebral cortex, non are lucila-ventricular. No lesions in the cervical spinal cord.     Review of outside clinical notes:  - Visit with Dr. Soo Atwood 10/2022 and 12/09/2022  - Visit with Dr Luiz Danielson 06/16/2022    Past medical history:  Patient Active Problem List   Diagnosis     Overweight     Ulcerative Colitis     Chronic abdominal pain     Nausea With Vomiting     Anxiety Disorder NOS     Panic disorder without agoraphobia     Classic Migraine (With Aura)     Alopecia     Insomnia     Fatigue     Telogen Effluvium     Dyschromia     Allergic reaction     Anaphylaxis     Magnetic resonance imaging of brain abnormal     Arteritis (H)     Back pain     Cholecystitis, chronic     CFS (chronic fatigue syndrome)     Non-specific colitis     Gastroesophageal reflux disease     Disorder of gallbladder     Migraine without status migrainosus, not intractable     Neurological disorder     Neurosis, posttraumatic     Dilated pupil     Retinal tear     Morbid obesity (H)         Medications:   BENADRYL PO  carBAMazepine  dicyclomine  EPINEPHrine  famotidine  fluticasone  gabapentin  levothyroxine  Lo Loestrin Fe Tabs  NEXIUM PO  Nurtec Tbdp  sertraline  Suvorexant  tiZANidine  ZOFRAN ODT PO      Family history / social history:  Patient's family history includes Cancer in her father; Dementia in her paternal grandmother;  Hypertension in her father; Kidney Disease in her paternal uncle; Migraines in her maternal grandmother, mother, and sister; No Known Problems in her daughter, maternal aunt, maternal grandfather, paternal aunt, paternal grandfather, and paternal grandmother.     Patient  reports that she has never smoked. She has never used smokeless tobacco. She reports that she does not drink alcohol and does not use drugs.       Exam:  Visual acuity without correction 20/25 right eye 20/25 left eye.  Color vision 11/11 right eye and 11/11 left eye.  Pupils isocoric without rAPD either eye.  Intraocular pressure 20 right eye and 20 left eye.    Strabismus exam shows ortho alignment on alternate cover and single alfredo salazar; full motility.  Anterior segment exam shows ocular surface dryness.  Fundus exam shows old inferotemporal chorioretinal scar in the right eye but no pallor or edema of the optic nerves.      Discussion of management / interpretation with another provider:  None    Assessment/Plan:   It is my impression that patient has dry eye syndrome and non-central cystoid macular edema that is being followed by [facility]. She is already using preservative-free artificial tears every 4 hours and ointment at bedtime, but these are not necessarily helping. We discussed adding warm compresses two times a day and adding PredForte eye drop TID taper over 3 weeks. If this helps, then most likely the cause of her double vision is a dry eye issue. In the mean time, she will schedule an appointment with [facility] for hard contact lens over refraction if the double vision does not resolve with the eyedrops.  If hard contact lens refraction does not improve her double vision, then would ask the retina specialist to consider treating the cystoid macular edema.  If the cystoid macular edema is resolved but the patient continues to have symptoms, then the only location of the double vision would be  lenticular.  She could consider cataract surgery if it continues to be a problem.    Regarding the trigeminal neuralgia, her symptoms are in remission (2x/week at most) on carbamazepine and I do no think the ocular symptoms are related. She does not have clinical evidence of optic neuritis and no evidence of optic neuropathy at this time.     The patient can follow-up with me as needed. I will communicate with Dr. Atwood, Dr. Renée Barboza and Dr. Roger Danielson.         Again, thank you for allowing me to participate in the care of your patient.      Sincerely,    Gabriel Simpson MD  Professor  Ophthalmology Residency   Director of Neuro-Ophthalmology  [employer] - [employer] Endowed Chair  Departments of Ophthalmology, Neurology, and Neurosurgery  [facility] [address] [place]  [address]  T - [phone]  F - [phone]  [email or web address]      CC: Portia Mcginnis MD  [address]  [facility] [address]  Via Fax: [phone]     Soo Atwood MD  [address] [address]  Via In Basket     Renée Barboza  [facility]  [address] [facility] [address]  Via Fax: [phone]     Luiz Danielson MD  [facility]           [address]      [facility] [address]  Via Fax: [phone]    DX = monocular double vision

## 2022-12-13 NOTE — Clinical Note
12/13/2022       RE: Zeynep Khan  [address] [address]     Dear Colleague,    Thank you for referring your patient, Zeynep Khan, to the [facility] [place] at [facility]. Please see a copy of my visit note below.        Zeynep Khan is a 52 year old female with the following diagnoses:   1. Eye pain, left    2. Photophobia of both eyes    3. Ulcerative pancolitis with rectal bleeding (H)         Patient was sent for consultation by Dr. Soo Atwood for double vision and photosensitivity.    HPI: Patient has PMHx of colonic benign tumors s/p partial colectomy in early 1990s, B12 deficiency, CKD, HLD, infertility, subclinical hypothyrodism, and left-sided V2/V3 trigeminal neuralgia and suspected demyelinating disease due to new onset weakness, gait changes, sacroiliac pain with sciatic pain, urinary incontinence in around 11/2021, seen by Dr. Atwood who has already worked her up for MS and does not suspect MS given negative oligoclonal bands in the CSF and non-specific white matter lesions non-diagnostic of MS. She has a POHx of mild CME and saw Dr. Luiz Danielson in 06/16/2022.      Patient has been reporting light sensitivity bilaterally since 12/2021, monocular double vision since 06/2022. For this, she has been seen by [facility] and [facility] who both agree on non-central CME as the cause but have not started any eye drops.    Besides the monocular diplopia, she denies significant changes to her central vision in either eye. Photosensitivity responds somewhat to artificial tears.   Denies Uhthoff's-vision changes.   Denies pain with eye movement that is any different than her baseline right eye ache when she looks side to side (started 10/2022).     On prednisone 40mg intermittent burst due to neurogenic cough which helps the cough and lower back sacroiliac pain and sciatic pain, helps with gait and hand dexterity and urinary  incontinence and trigeminal neuralgia pain, but has not changed any ocular symptoms.       Independent historians: Patient and chart review    Review of outside testing:  MRI brain and cervical spine without contrast 11/10/2022  No formal read accessible at this time    My interpretation performed today of outside testing:  MRI brain and cervical spine 11/10/2022  Limited study due to patient's allergy to gadolinium contrast. ***Punctate white matter lesions in the cerebral cortex, non are lucila-ventricular. No lesions in the cervical spinal cord.     Review of outside clinical notes:  - Visit with Dr. Soo Atwood 10/2022 and 12/09/2022  - Visit with Dr Luiz Danielson 06/16/2022    Past medical history:  Patient Active Problem List   Diagnosis     Overweight     Ulcerative Colitis     Chronic abdominal pain     Nausea With Vomiting     Anxiety Disorder NOS     Panic disorder without agoraphobia     Classic Migraine (With Aura)     Alopecia     Insomnia     Fatigue     Telogen Effluvium     Dyschromia     Allergic reaction     Anaphylaxis     Magnetic resonance imaging of brain abnormal     Arteritis (H)     Back pain     Cholecystitis, chronic     CFS (chronic fatigue syndrome)     Non-specific colitis     Gastroesophageal reflux disease     Disorder of gallbladder     Migraine without status migrainosus, not intractable     Neurological disorder     Neurosis, posttraumatic     Dilated pupil     Retinal tear     Morbid obesity (H)         Medications:   BENADRYL PO  carBAMazepine  dicyclomine  EPINEPHrine  famotidine  fluticasone  gabapentin  levothyroxine  Lo Loestrin Fe Tabs  NEXIUM PO  Nurtec Tbdp  sertraline  Suvorexant  tiZANidine  ZOFRAN ODT PO      Family history / social history:  Patient's family history includes Cancer in her father; Dementia in her paternal grandmother; Hypertension in her father; Kidney Disease in her paternal uncle; Migraines in her maternal grandmother, mother, and sister;  No Known Problems in her daughter, maternal aunt, maternal grandfather, paternal aunt, paternal grandfather, and paternal grandmother.     Patient  reports that she has never smoked. She has never used smokeless tobacco. She reports that she does not drink alcohol and does not use drugs.       Exam:  Visual acuity without correction 20/25 right eye 20/25 left eye.  Color vision 11/11 right eye and 11/11 left eye.  Pupils isocoric without rAPD either eye.  Intraocular pressure 20 right eye and 20 left eye.    Strabismus exam shows ortho alignment on alternate cover and single alfredo salazar; full motility.  Anterior segment exam shows ocular surface dryness.  Fundus exam shows old inferotemporal chorioretinal scar in the right eye but no pallor or edema of the optic nerves.      Discussion of management / interpretation with another provider:  None    Assessment/Plan:   It is my impression that patient has dry eye syndrome and non-central cystoid macular edema that is being followed by [facility]. She is already using preservative-free artificial tears every 4 hours and ointment at bedtime, but these are not necessarily helping. We discussed adding warm compresses two times a day and adding PredForte eye drop TID taper over 3 weeks. If this helps, then most likely the cause of her double vision is a dry eye issue. In the mean time, she will schedule an appointment with [facility] for hard contact lens over refraction if the double vision does not resolve with the eyedrops.  If hard contact lens refraction does not improve her double vision, then would ask the retina specialist to consider treating the cystoid macular edema.  If the cystoid macular edema is resolved but the patient continues to have symptoms, then the only location of the double vision would be lenticular.  She could consider cataract surgery if it continues to be a problem.    Regarding the trigeminal neuralgia, her symptoms  are in remission (2x/week at most) on carbamazepine and I do no think the ocular symptoms are related. She does not have clinical evidence of optic neuritis and no evidence of optic neuropathy at this time.     The patient can follow-up with me as needed. I will communicate with Dr. Atwood, Dr. Renée Barboza and Dr. Roger Danielson.         Attending Physician Attestation:  I have seen and examined this patient.  I have confirmed and edited as necessary the chief complaint(s), history of present illness, review of systems, relevant history, and examination findings as documented by others.  I have personally reviewed the relevant tests, images, and reports as documented above.  I have confirmed and edited as necessary the assessment and plan and agree with this note.  - Gabriel Simpson MD 9:30 AM 12/13/2022    Caden Vaca MD  Ophthalmology Resident PGY3      Again, thank you for allowing me to participate in the care of your patient.      Sincerely,    Gabriel Simpson MD

## 2022-12-13 NOTE — PROGRESS NOTES
Zeynep Khan is a 52 year old female with the following diagnoses:   1. Diplopia    2. Eye pain, left    3. Photophobia of both eyes    4. Dry eye syndrome of both eyes         Patient was sent for consultation by Dr. Soo Atwood for double vision and photosensitivity.    HPI: Patient has PMHx of colonic benign tumors s/p partial colectomy in early 1990s, B12 deficiency, CKD, HLD, infertility, subclinical hypothyrodism, and left-sided V2/V3 trigeminal neuralgia and suspected demyelinating disease due to new onset weakness, gait changes, sacroiliac pain with sciatic pain, urinary incontinence in around 11/2021, seen by Dr. Atwood who has already worked her up for MS and does not suspect MS given negative oligoclonal bands in the CSF and non-specific white matter lesions non-diagnostic of MS. She has a POHx of mild CME and saw Dr. Luiz Danielson in 06/16/2022.      Patient has been reporting light sensitivity bilaterally since 12/2021, monocular double vision since 06/2022. For this, she has been seen by [facility] and [facility] who both agree on non-central CME as the cause but have not started any eye drops.    Besides the monocular diplopia, she denies significant changes to her central vision in either eye. Photosensitivity responds somewhat to artificial tears.   Denies Uhthoff's-vision changes.   Denies pain with eye movement that is any different than her baseline right eye ache when she looks side to side (started 10/2022).     On prednisone 40mg intermittent burst due to neurogenic cough which helps the cough and lower back sacroiliac pain and sciatic pain, helps with gait and hand dexterity and urinary incontinence and trigeminal neuralgia pain, but has not changed any ocular symptoms.       Independent historians: Patient and chart review    Review of outside testing:  MRI brain and cervical spine without contrast 11/10/2022  No formal read accessible at this time    My interpretation performed today  of outside testing:  MRI brain and cervical spine 11/10/2022  Limited study due to patient's allergy to gadolinium contrast. Punctate white matter lesions in the cerebral cortex, non are lucila-ventricular. No lesions in the cervical spinal cord.     Review of outside clinical notes:  - Visit with Dr. Soo Atwood 10/2022 and 12/09/2022  - Visit with Dr Luiz Danielson 06/16/2022    Past medical history:  Patient Active Problem List   Diagnosis     Overweight     Ulcerative Colitis     Chronic abdominal pain     Nausea With Vomiting     Anxiety Disorder NOS     Panic disorder without agoraphobia     Classic Migraine (With Aura)     Alopecia     Insomnia     Fatigue     Telogen Effluvium     Dyschromia     Allergic reaction     Anaphylaxis     Magnetic resonance imaging of brain abnormal     Arteritis (H)     Back pain     Cholecystitis, chronic     CFS (chronic fatigue syndrome)     Non-specific colitis     Gastroesophageal reflux disease     Disorder of gallbladder     Migraine without status migrainosus, not intractable     Neurological disorder     Neurosis, posttraumatic     Dilated pupil     Retinal tear     Morbid obesity (H)         Medications:   BENADRYL PO  carBAMazepine  dicyclomine  EPINEPHrine  famotidine  fluticasone  gabapentin  levothyroxine  Lo Loestrin Fe Tabs  NEXIUM PO  Nurtec Tbdp  sertraline  Suvorexant  tiZANidine  ZOFRAN ODT PO      Family history / social history:  Patient's family history includes Cancer in her father; Dementia in her paternal grandmother; Hypertension in her father; Kidney Disease in her paternal uncle; Migraines in her maternal grandmother, mother, and sister; No Known Problems in her daughter, maternal aunt, maternal grandfather, paternal aunt, paternal grandfather, and paternal grandmother.     Patient  reports that she has never smoked. She has never used smokeless tobacco. She reports that she does not drink alcohol and does not use drugs.       Exam:  Visual  acuity without correction 20/25 right eye 20/25 left eye.  Color vision 11/11 right eye and 11/11 left eye.  Pupils isocoric without rAPD either eye.  Intraocular pressure 20 right eye and 20 left eye.    Strabismus exam shows ortho alignment on alternate cover and single alfredo salazar; full motility.  Anterior segment exam shows ocular surface dryness.  Fundus exam shows old inferotemporal chorioretinal scar in the right eye but no pallor or edema of the optic nerves.      Discussion of management / interpretation with another provider:  None    Assessment/Plan:   It is my impression that patient has dry eye syndrome and non-central cystoid macular edema that is being followed by [facility]. She is already using preservative-free artificial tears every 4 hours and ointment at bedtime, but these are not necessarily helping. We discussed adding warm compresses two times a day and adding PredForte eye drop TID taper over 3 weeks. If this helps, then most likely the cause of her double vision is a dry eye issue. In the mean time, she will schedule an appointment with [facility] for hard contact lens over refraction if the double vision does not resolve with the eyedrops.  If hard contact lens refraction does not improve her double vision, then would ask the retina specialist to consider treating the cystoid macular edema.  If the cystoid macular edema is resolved but the patient continues to have symptoms, then the only location of the double vision would be lenticular.  She could consider cataract surgery if it continues to be a problem.    Regarding the trigeminal neuralgia, her symptoms are in remission (2x/week at most) on carbamazepine and I do no think the ocular symptoms are related. She does not have clinical evidence of optic neuritis and no evidence of optic neuropathy at this time.     The patient can follow-up with me as needed. I will communicate with Dr. Atwood, Dr. Renée Barboza  and Dr. Roger Danielson.          Attending Physician Attestation:  I have seen and examined this patient.  I have confirmed and edited as necessary the chief complaint(s), history of present illness, review of systems, relevant history, and examination findings as documented by others.  I have personally reviewed the relevant tests, images, and reports as documented above.  I have confirmed and edited as necessary the assessment and plan and agree with this note.  - Gabriel Simpson MD 9:30 AM 12/13/2022    Caden Vaca MD  Ophthalmology Resident PGY3

## 2022-12-13 NOTE — NURSING NOTE
"Chief Complaint(s) and History of Present Illness(es)     Diplopia Evaluation    In both eyes.  Disease is new.  Characterized as oblique.  Duration of 6 months.  Occurring constantly.  Occurring all the time.  Since onset it is stable.  Associated symptoms include eye pain and abnormal gait.  Negative for droopy eyelid, unequal pupil size and headaches.  Pain was noted as 0/10.           Comments    Patient was sent for consultation by Dr. Atwood for double vision and photosensitivity.    Zeynep says she woke up on day in June 2022 with sudden onset of oblique diplopia.  She saw a retina specialist and was dx with cystic macular edema of both eyes.  The specialist said her diplopia should improve in a month but she did not notice an changes from June to Oct 2022.  She reports the diplopia is worse in the right eye.  She is also really photophobic and says this has been ongoing for the past year.  She wears sunglasses all the time and even indoors.  She will occasionally have flashes in her vision.  She has a hx of migraines but says her right \"eyeball\" feels painful.  CARMEN Hawkins 12/13/2022 8:46 AM                       "

## 2022-12-15 NOTE — TELEPHONE ENCOUNTER
RECORDS RECEIVED FROM: Internal   DATE RECEIVED: 2.17.23   NOTES STATUS DETAILS   [facility]   OFFICE NOTE from other specialist Received 6.27.22 Krishan Tom  4.1.22 Dr. Martel, Krishan  3.23.22 Krishan Patel   DISCHARGE SUMMARY from hospital     DISCHARGE REPORT from the ER     MEDICATION LIST Internal    IMAGING  (NEED IMAGES AND REPORTS)     CT SCAN In process 4.4.22 [facility]   CHEST XRAY (CXR) In process/internal [facility]  10.21.22    Krishan  3.23.22   TESTS     PULMONARY FUNCTION TESTING (PFT) Care Everywhere Krishan  6.27.22       Action 12.15.22 [facility]   Action Taken Requested image from Krishan

## 2022-12-19 ENCOUNTER — TELEPHONE (OUTPATIENT)
Dept: NEUROLOGY | Facility: CLINIC | Age: 52
End: 2022-12-19

## 2022-12-19 NOTE — TELEPHONE ENCOUNTER
Health Call Center    Phone Message    May a detailed message be left on voicemail: yes     Reason for Call: Other: Would like a call back from Dr. Atwood regarding patient's mental health. Please call Dr. Susi Logan her mental health specialist at [facility] at [phone].    Action Taken: Message routed to:  [facility] ([facility]):  MS    Travel Screening: Not Applicable

## 2022-12-26 NOTE — TELEPHONE ENCOUNTER
Discussed with provider    Additional tests pending     I will contact her after the follow up appointment in February   Soo Atwood MD on 12/26/2022 at 4:49 PM

## 2023-02-07 ENCOUNTER — OFFICE VISIT (OUTPATIENT)
Dept: FAMILY MEDICINE | Facility: CLINIC | Age: 53
End: 2023-02-07
Payer: COMMERCIAL

## 2023-02-07 VITALS
RESPIRATION RATE: 22 BRPM | TEMPERATURE: 98.3 F | WEIGHT: 238 LBS | BODY MASS INDEX: 36.46 KG/M2 | SYSTOLIC BLOOD PRESSURE: 158 MMHG | OXYGEN SATURATION: 98 % | HEART RATE: 109 BPM | DIASTOLIC BLOOD PRESSURE: 94 MMHG

## 2023-02-07 DIAGNOSIS — N39.41 URGE INCONTINENCE OF URINE: Primary | ICD-10-CM

## 2023-02-07 LAB
ALBUMIN UR-MCNC: NEGATIVE MG/DL
APPEARANCE UR: CLEAR
BACTERIA #/AREA URNS HPF: ABNORMAL /HPF
BILIRUB UR QL STRIP: NEGATIVE
COLOR UR AUTO: YELLOW
GLUCOSE UR STRIP-MCNC: NEGATIVE MG/DL
HGB UR QL STRIP: ABNORMAL
KETONES UR STRIP-MCNC: NEGATIVE MG/DL
LEUKOCYTE ESTERASE UR QL STRIP: NEGATIVE
NITRATE UR QL: NEGATIVE
PH UR STRIP: 5 [PH] (ref 5–8)
RBC #/AREA URNS AUTO: ABNORMAL /HPF
SP GR UR STRIP: 1.02 (ref 1–1.03)
SQUAMOUS #/AREA URNS AUTO: ABNORMAL /LPF
UROBILINOGEN UR STRIP-ACNC: 0.2 E.U./DL
WBC #/AREA URNS AUTO: ABNORMAL /HPF

## 2023-02-07 PROCEDURE — 81001 URINALYSIS AUTO W/SCOPE: CPT | Performed by: PHYSICIAN ASSISTANT

## 2023-02-07 PROCEDURE — 99204 OFFICE O/P NEW MOD 45 MIN: CPT | Performed by: PHYSICIAN ASSISTANT

## 2023-02-07 RX ORDER — DOXYCYCLINE 50 MG/1
CAPSULE ORAL
COMMUNITY
Start: 2023-02-06

## 2023-02-07 RX ORDER — DEXTROAMPHETAMINE SACCHARATE, AMPHETAMINE ASPARTATE, DEXTROAMPHETAMINE SULFATE AND AMPHETAMINE SULFATE 5; 5; 5; 5 MG/1; MG/1; MG/1; MG/1
TABLET ORAL
COMMUNITY
Start: 2021-08-15

## 2023-02-07 ASSESSMENT — PAIN SCALES - GENERAL: PAINLEVEL: EXTREME PAIN (8)

## 2023-02-07 ASSESSMENT — ENCOUNTER SYMPTOMS
ABDOMINAL PAIN: 0
FEVER: 0
DYSURIA: 1
HEMATURIA: 0
NAUSEA: 1
FREQUENCY: 1
FLANK PAIN: 0
VOMITING: 0
CHILLS: 0

## 2023-02-07 NOTE — PROGRESS NOTES
Patient presents with:  Back Pain: Lower back pain, urge to urinate, and nausea x 5 days       Clinical Decision Making:  Patient experiencing back pain and urinary urgency along with nausea.  Given severity of pain I do have high suspicion for nephrolithiasis.  UA is not indicative of infection.  Patient did significant enough pain that I feel she need to be evaluated and worked up in the ED where she can receive additional pain relievers.  Patient is agreeable to this plan.  She prefers to see providers at [facility].  Report given to [facility] ED provider prior to the patient's arrival.    ICD-10-CM    1. Urge incontinence of urine  N39.41 UA macro with reflex to Microscopic and Culture - Clinc Collect     Urine Microscopic          There are no Patient Instructions on file for this visit.    HPI:  Zeynep Khan is a 52 year old female who presents today complaining of urinary urgency, nausea, left low back pain for the past 5 days.  No known history of nephrolithiasis.  Left-sided low back pain has been increasing today.  She has a history of stage III kidney disease per patient.  She took Tylenol 4 hours ago.    History obtained from the patient.    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    Social History     Tobacco Use     Smoking status: Never     Smokeless tobacco: Never   Substance Use Topics     Alcohol use: No       Review of Systems   Constitutional: Negative for chills and fever.   Gastrointestinal: Positive for nausea. Negative for abdominal pain and vomiting.   Genitourinary: Positive for dysuria, frequency and urgency. Negative for flank pain, hematuria, vaginal discharge and vaginal pain.       Vitals:    02/07/23 1449   BP: (!) 158/94   BP Location: Right arm   Patient Position: Sitting   Cuff Size: Adult Large   Pulse: 109   Resp: 22   Temp: 98.3  F (36.8  C)   TempSrc: Oral   SpO2: 98%   Weight: 108 kg (238 lb)       Physical Exam  Vitals and nursing note reviewed.   Constitutional:       General: She is in acute distress (patient is rocking in pain. When I re-enter the room she is pacing. ).      Appearance: She is not toxic-appearing or diaphoretic.   HENT:      Head: Normocephalic and atraumatic.      Right Ear: External ear normal.      Left Ear: External ear normal.   Eyes:      Conjunctiva/sclera: Conjunctivae normal.   Pulmonary:      Effort: Pulmonary effort is normal. No respiratory distress.   Neurological:      Mental Status: She is alert.   Psychiatric:         Mood and Affect: Mood normal.         Behavior: Behavior normal.         Thought Content: Thought content normal.         Judgment: Judgment normal.         Results:  Results for orders placed or performed in visit on 02/07/23   UA macro with reflex to Microscopic and Culture - Clinc Collect     Status: Abnormal    Specimen: Urine, Clean Catch   Result Value Ref Range    Color Urine Yellow Colorless, Straw, Light Yellow, Yellow    Appearance Urine Clear Clear    Glucose Urine Negative Negative mg/dL    Bilirubin Urine Negative Negative    Ketones Urine Negative Negative mg/dL    Specific Gravity Urine 1.020 1.005 - 1.030    Blood Urine Moderate (A) Negative    pH Urine 5.0 5.0 - 8.0     Protein Albumin Urine Negative Negative mg/dL    Urobilinogen Urine 0.2 0.2, 1.0 E.U./dL    Nitrite Urine Negative Negative    Leukocyte Esterase Urine Negative Negative   Urine Microscopic     Status: Abnormal   Result Value Ref Range    Bacteria Urine Few (A) None Seen /HPF    RBC Urine 5-10 (A) 0-2 /HPF /HPF    WBC Urine None Seen 0-5 /HPF /HPF    Squamous Epithelials Urine Few (A) None Seen /LPF    Narrative    Urine Culture not indicated         At the end of the encounter, I discussed results, diagnosis, medications. Discussed red flags for immediate return to clinic/ER, as well as indications for follow up if no improvement. Patient understood and agreed to plan. Patient was stable for discharge.

## 2023-02-17 ENCOUNTER — PRE VISIT (OUTPATIENT)
Dept: PULMONOLOGY | Facility: CLINIC | Age: 53
End: 2023-02-17

## 2023-02-21 ENCOUNTER — VIRTUAL VISIT (OUTPATIENT)
Dept: NEUROSURGERY | Facility: CLINIC | Age: 53
End: 2023-02-21
Payer: COMMERCIAL

## 2023-02-21 DIAGNOSIS — G50.0 TRIGEMINAL NEURALGIA: Primary | ICD-10-CM

## 2023-02-21 PROCEDURE — 99214 OFFICE O/P EST MOD 30 MIN: CPT | Mod: VID | Performed by: NURSE PRACTITIONER

## 2023-02-21 NOTE — NURSING NOTE
Chief Complaint   Patient presents with     Video Visit     Follow up - [place]       Is the patient currently in the state of [place]? YES    Visit mode:VIDEO    If the visit is dropped, the patient can be reconnected by: VIDEO VISIT: Text to cell phone: [phone]    Will anyone else be joining the visit? NO      How would you like to obtain your AVS? MyChart    Are changes needed to the allergy or medication list? NO    Comments or concerns regarding today's visit:

## 2023-02-21 NOTE — PROGRESS NOTES
Video-Visit Details    Type of service:  Video Visit    Video Start Time (time video started): 1050    Video End Time (time video stopped): 1115    Originating Location (pt. Location): Home        Distant Location (provider location):  On-site    Mode of Communication:  Video Conference via [facility]  Department of Neurosurgery      Name: Zeynep Khan  MRN: [id number]  Age: 52 year old  : 1970  Referring provider: Referred Self  2023      Chief Complaint:   Right sided trigeminal neuralgia  Follow-up    History of Present Illness:   Zeynep Khan is a 52 year old female with a history of right V2 and V3 trigeminal neuralgia since 2019 who is seen today for a follow-up.    Most recent visit with me on 2022.  We decided to increase her Tegretol and gradually wean her off of gabapentin.     Today, I had a video visit with the patient.  For the past 2 months or so, she has been dealing with some increased symptoms of right-sided trigeminal neuralgia.  She had an evaluation at [facility]  and was recommended to wean off of gabapentin.  She is completely off of this medication as of last week.  She is currently taking carbamazepine 200 twice a day.  Lyrica was recently recommended by her primary care provider.    Patient had abnormal brain MRIs in the past showing white matter lesions consistent with a demyelinating disease.  She had a negative lumbar puncture in  at [facility].  She was seen by Dr. Atwood in our MS clinic. Per clinic notes, MRI brain does reveal multiple white matter lesions. Pattern is not diagnostic for MS, particularly given csf neg for oligoclonal bands. Cannot undergo additional scans because of metal in body. This limits diagnostic studies that can be done.    Patient had a brain MRI in 2022.  No contrast given due to contrast allergy.  Images are available in PACS.    Review of Systems:   Pertinent items are noted in HPI or as in  patient entered ROS below, remainder of complete ROS is negative.    ENT ROS 2016   Constitutional: Weight gain, Unexplained fatigue, Unexplained fever or night sweats   Ears, Nose, Throat: Ringing/noise in ears   Cardiopulmonary: Cough, Wheezing   Gastrointestinal/Genitourinary: Heartburn/indigestion, Diarrhea   Musculoskeletal: Sore or stiff joints, Back pain   Endocrine: Heat or cold intolerance        Physical Exam:   There were no vitals taken for this visit.   General: No acute distress.    Neuro: The patient is fully oriented. Speech is normal.   Psych: Normal mood and affect. Behavior is normal.        Imagin2022 MRI Brain without contrast:  CONCLUSION:    1. Scattered areas of punctate white matter T2 hyperintensity are nonspecific. Differential diagnostic considerations include mild microangiopathic change, the sequelae of demyelinating disease (such as multiple sclerosis), CNS vasculitis and/or Lyme's disease. Similar findings have also been reported in the setting of migraine headaches.    2. No abnormal intracranial mass, intracranial hemorrhage or acute or subacute infarction.    3. Mild mucosal thickening in a solitary left posterior ethmoid air cell.      Assessment:  Right-sided trigeminal neuralgia  Follow-up    Plan:  Continue carbamazepine 200 mg twice daily.  She had normal blood work in 2023.  If her symptoms become worse, she can increase the p.m. dose to 400 mg.  She is planning on starting Lyrica 75 mg soon as recommended by her GP.    We will review her recent imaging and will contact the patient if there is any new recommendations.  Otherwise she will plan to follow-up with me in 6 months.       I spent 30 minutes on patient care activities related to this encounter on the date of service, including time spent reviewing the chart, obtaining history and examination and in counseling the patient, and in documentation in the electronic medical record.      Katie KAUFMAN  Jerod DEJESUS, CNP  Department of Neurosurgery

## 2023-02-21 NOTE — LETTER
2023       RE: Zeynep Khan  [address] [facility] [address]     Dear Colleague,    Thank you for referring your patient, Zeynep Khan, to the [facility] [place] [facility]. Please see a copy of my visit note below.    [facility]  Department of Neurosurgery      Name: Zeynep Khan  MRN: [id number]  Age: 52 year old  : 1970  Referring provider: Referred Self  2023      Chief Complaint:   Right sided trigeminal neuralgia  Follow-up    History of Present Illness:   Zeynep Khan is a 52 year old female with a history of right V2 and V3 trigeminal neuralgia since 2019 who is seen today for a follow-up.    Most recent visit with me on 2022.  We decided to increase her Tegretol and gradually wean her off of gabapentin.     Today, I had a video visit with the patient.  For the past 2 months or so, she has been dealing with some increased symptoms of right-sided trigeminal neuralgia.  She had an evaluation at [facility]  and was recommended to wean off of gabapentin.  She is completely off of this medication as of last week.  She is currently taking carbamazepine 200 twice a day.  Lyrica was recently recommended by her primary care provider.    Patient had abnormal brain MRIs in the past showing white matter lesions consistent with a demyelinating disease.  She had a negative lumbar puncture in  at [facility].  She was seen by Dr. Atwood in our MS clinic. Per clinic notes, MRI brain does reveal multiple white matter lesions. Pattern is not diagnostic for MS, particularly given csf neg for oligoclonal bands. Cannot undergo additional scans because of metal in body. This limits diagnostic studies that can be done.    Patient had a brain MRI in 2022.  No contrast given due to contrast allergy.  Images are available in PACS.    Review of Systems:   Pertinent items are noted in HPI or as in  patient entered ROS below, remainder of complete ROS is negative.    ENT ROS 2016   Constitutional: Weight gain, Unexplained fatigue, Unexplained fever or night sweats   Ears, Nose, Throat: Ringing/noise in ears   Cardiopulmonary: Cough, Wheezing   Gastrointestinal/Genitourinary: Heartburn/indigestion, Diarrhea   Musculoskeletal: Sore or stiff joints, Back pain   Endocrine: Heat or cold intolerance        Physical Exam:   There were no vitals taken for this visit.   General: No acute distress.    Neuro: The patient is fully oriented. Speech is normal.   Psych: Normal mood and affect. Behavior is normal.        Imagin2022 MRI Brain without contrast:  CONCLUSION:    1. Scattered areas of punctate white matter T2 hyperintensity are nonspecific. Differential diagnostic considerations include mild microangiopathic change, the sequelae of demyelinating disease (such as multiple sclerosis), CNS vasculitis and/or Lyme's disease. Similar findings have also been reported in the setting of migraine headaches.    2. No abnormal intracranial mass, intracranial hemorrhage or acute or subacute infarction.    3. Mild mucosal thickening in a solitary left posterior ethmoid air cell.      Assessment:  Right-sided trigeminal neuralgia  Follow-up    Plan:  Continue carbamazepine 200 mg twice daily.  She had normal blood work in 2023.  If her symptoms become worse, she can increase the p.m. dose to 400 mg.  She is planning on starting Lyrica 75 mg soon as recommended by her GP.    We will review her recent imaging and will contact the patient if there is any new recommendations.  Otherwise she will plan to follow-up with me in 6 months.       I spent 30 minutes on patient care activities related to this encounter on the date of service, including time spent reviewing the chart, obtaining history and examination and in counseling the patient, and in documentation in the electronic medical record.      Katie KAUFMAN  Jerod DEJESUS, CNP  Department of Neurosurgery

## 2023-02-22 NOTE — PATIENT INSTRUCTIONS
Continue carbamazepine 200 mg twice daily.  If your symptoms increase, okay to increase dose to 400 mg.    Start Lyrica 75 mg daily and gradually increase the dose.    Follow-up with me in 6 months.

## 2023-03-26 ENCOUNTER — HEALTH MAINTENANCE LETTER (OUTPATIENT)
Age: 53
End: 2023-03-26

## 2023-06-01 ENCOUNTER — HEALTH MAINTENANCE LETTER (OUTPATIENT)
Age: 53
End: 2023-06-01

## 2023-06-14 ENCOUNTER — ANCILLARY PROCEDURE (OUTPATIENT)
Dept: MAMMOGRAPHY | Facility: CLINIC | Age: 53
End: 2023-06-14
Attending: OBSTETRICS & GYNECOLOGY
Payer: COMMERCIAL

## 2023-06-14 DIAGNOSIS — Z12.31 VISIT FOR SCREENING MAMMOGRAM: ICD-10-CM

## 2023-06-14 PROCEDURE — 77067 SCR MAMMO BI INCL CAD: CPT

## 2023-08-07 ENCOUNTER — TRANSFERRED RECORDS (OUTPATIENT)
Dept: HEALTH INFORMATION MANAGEMENT | Facility: CLINIC | Age: 53
End: 2023-08-07

## 2023-10-18 ENCOUNTER — TRANSCRIBE ORDERS (OUTPATIENT)
Dept: OTHER | Age: 53
End: 2023-10-18

## 2023-10-18 DIAGNOSIS — G89.4 CHRONIC PAIN SYNDROME: ICD-10-CM

## 2023-10-18 DIAGNOSIS — G50.0 TRIGEMINAL NEURALGIA OF LEFT SIDE OF FACE: Primary | ICD-10-CM

## 2023-11-27 ENCOUNTER — MYC MEDICAL ADVICE (OUTPATIENT)
Dept: NEUROLOGY | Facility: CLINIC | Age: 53
End: 2023-11-27
Payer: COMMERCIAL

## 2023-11-27 DIAGNOSIS — H53.143 PHOTOPHOBIA OF BOTH EYES: Primary | ICD-10-CM

## 2023-11-27 DIAGNOSIS — G89.4 CHRONIC PAIN SYNDROME: ICD-10-CM

## 2024-08-10 ENCOUNTER — HEALTH MAINTENANCE LETTER (OUTPATIENT)
Age: 54
End: 2024-08-10

## 2024-08-30 ENCOUNTER — ANCILLARY PROCEDURE (OUTPATIENT)
Dept: MAMMOGRAPHY | Facility: CLINIC | Age: 54
End: 2024-08-30
Attending: OBSTETRICS & GYNECOLOGY
Payer: COMMERCIAL

## 2024-08-30 DIAGNOSIS — Z12.31 VISIT FOR SCREENING MAMMOGRAM: ICD-10-CM

## 2024-08-30 PROCEDURE — 77063 BREAST TOMOSYNTHESIS BI: CPT

## 2025-01-28 ENCOUNTER — TRANSFERRED RECORDS (OUTPATIENT)
Dept: HEALTH INFORMATION MANAGEMENT | Facility: CLINIC | Age: 55
End: 2025-01-28

## 2025-05-16 ENCOUNTER — LAB REQUISITION (OUTPATIENT)
Dept: LAB | Facility: CLINIC | Age: 55
End: 2025-05-16
Payer: COMMERCIAL

## 2025-05-16 DIAGNOSIS — Z12.4 ENCOUNTER FOR SCREENING FOR MALIGNANT NEOPLASM OF CERVIX: ICD-10-CM

## 2025-05-16 PROCEDURE — G0145 SCR C/V CYTO,THINLAYER,RESCR: HCPCS | Mod: ORL | Performed by: OBSTETRICS & GYNECOLOGY

## 2025-05-16 PROCEDURE — 87624 HPV HI-RISK TYP POOLED RSLT: CPT | Mod: ORL | Performed by: OBSTETRICS & GYNECOLOGY

## 2025-05-19 ENCOUNTER — PATIENT OUTREACH (OUTPATIENT)
Dept: CARE COORDINATION | Facility: CLINIC | Age: 55
End: 2025-05-19
Payer: COMMERCIAL

## 2025-05-19 LAB
HPV HR 12 DNA CVX QL NAA+PROBE: NEGATIVE
HPV16 DNA CVX QL NAA+PROBE: NEGATIVE
HPV18 DNA CVX QL NAA+PROBE: NEGATIVE
HUMAN PAPILLOMA VIRUS FINAL DIAGNOSIS: NORMAL

## 2025-05-21 LAB
BKR AP ASSOCIATED HPV REPORT: NORMAL
BKR LAB AP GYN ADEQUACY: NORMAL
BKR LAB AP GYN INTERPRETATION: NORMAL
BKR LAB AP LMP: NORMAL
BKR LAB AP PREVIOUS ABNL DX: NORMAL
BKR LAB AP PREVIOUS ABNORMAL: NORMAL
PATH REPORT.COMMENTS IMP SPEC: NORMAL
PATH REPORT.COMMENTS IMP SPEC: NORMAL
PATH REPORT.RELEVANT HX SPEC: NORMAL

## 2025-05-22 ENCOUNTER — TELEPHONE (OUTPATIENT)
Dept: RHEUMATOLOGY | Facility: CLINIC | Age: 55
End: 2025-05-22
Payer: MEDICARE

## 2025-05-22 NOTE — TELEPHONE ENCOUNTER
[facility] Call Center    Phone Message    May a detailed message be left on voicemail: yes     Reason for Call: Other: Patient scheduled visit here with Dr. Marrero and just wanted me to pass along the message that it took her some time to find Dr. Marrero here but she is very excited to see her again on 7/22/25. She is also working on getting those MR transferred here.      Action Taken: Other: RHEUM    Travel Screening: Not Applicable     Date of Service: 5/22/25

## 2025-06-05 NOTE — TELEPHONE ENCOUNTER
Noted, will watch for medical records.      LUIS Rodriguez  Rheumatology/Infectious disease  [facility]   Rheumatology ph:[phone]  Infectious Disease ph:[phone]

## 2025-06-12 ENCOUNTER — RESULTS FOLLOW-UP (OUTPATIENT)
Dept: RHEUMATOLOGY | Facility: CLINIC | Age: 55
End: 2025-06-12

## 2025-06-12 ENCOUNTER — OFFICE VISIT (OUTPATIENT)
Dept: RHEUMATOLOGY | Facility: CLINIC | Age: 55
End: 2025-06-12
Payer: MEDICARE

## 2025-06-12 VITALS
SYSTOLIC BLOOD PRESSURE: 101 MMHG | HEART RATE: 116 BPM | OXYGEN SATURATION: 96 % | BODY MASS INDEX: 33.09 KG/M2 | DIASTOLIC BLOOD PRESSURE: 67 MMHG | WEIGHT: 216 LBS

## 2025-06-12 DIAGNOSIS — Z86.69 H/O DEMYELINATING DISEASE: ICD-10-CM

## 2025-06-12 DIAGNOSIS — N18.9 CHRONIC KIDNEY DISEASE, UNSPECIFIED CKD STAGE: ICD-10-CM

## 2025-06-12 DIAGNOSIS — N28.89 RENAL MASS: ICD-10-CM

## 2025-06-12 DIAGNOSIS — K51.90 ULCERATIVE COLITIS WITHOUT COMPLICATIONS, UNSPECIFIED LOCATION (H): ICD-10-CM

## 2025-06-12 DIAGNOSIS — M47.819 SPONDYLOARTHRITIS: Primary | ICD-10-CM

## 2025-06-12 DIAGNOSIS — Z79.899 HIGH RISK MEDICATION USE: ICD-10-CM

## 2025-06-12 DIAGNOSIS — D84.9 IMMUNOSUPPRESSED STATUS: ICD-10-CM

## 2025-06-12 LAB
ALP SERPL-CCNC: 97 U/L (ref 40–150)
ALT SERPL W P-5'-P-CCNC: 18 U/L (ref 0–50)
AST SERPL W P-5'-P-CCNC: 22 U/L (ref 0–45)
BASOPHILS # BLD AUTO: 0.1 10E3/UL (ref 0–0.2)
BASOPHILS NFR BLD AUTO: 1 %
CREAT SERPL-MCNC: 0.99 MG/DL (ref 0.51–0.95)
CRP SERPL-MCNC: <3 MG/L
EGFRCR SERPLBLD CKD-EPI 2021: 67 ML/MIN/1.73M2
EOSINOPHIL # BLD AUTO: 0.2 10E3/UL (ref 0–0.7)
EOSINOPHIL NFR BLD AUTO: 2 %
ERYTHROCYTE [DISTWIDTH] IN BLOOD BY AUTOMATED COUNT: 12.9 % (ref 10–15)
ERYTHROCYTE [SEDIMENTATION RATE] IN BLOOD BY WESTERGREN METHOD: 1 MM/HR (ref 0–30)
HCT VFR BLD AUTO: 48.5 % (ref 35–47)
HGB BLD-MCNC: 16.3 G/DL (ref 11.7–15.7)
IMM GRANULOCYTES # BLD: 0 10E3/UL
IMM GRANULOCYTES NFR BLD: 0 %
LYMPHOCYTES # BLD AUTO: 3.3 10E3/UL (ref 0.8–5.3)
LYMPHOCYTES NFR BLD AUTO: 28 %
MCH RBC QN AUTO: 32.2 PG (ref 26.5–33)
MCHC RBC AUTO-ENTMCNC: 33.6 G/DL (ref 31.5–36.5)
MCV RBC AUTO: 96 FL (ref 78–100)
MONOCYTES # BLD AUTO: 0.9 10E3/UL (ref 0–1.3)
MONOCYTES NFR BLD AUTO: 7 %
NEUTROPHILS # BLD AUTO: 7.3 10E3/UL (ref 1.6–8.3)
NEUTROPHILS NFR BLD AUTO: 62 %
NRBC # BLD AUTO: 0 10E3/UL
NRBC BLD AUTO-RTO: 0 /100
PLATELET # BLD AUTO: 344 10E3/UL (ref 150–450)
RBC # BLD AUTO: 5.06 10E6/UL (ref 3.8–5.2)
WBC # BLD AUTO: 11.8 10E3/UL (ref 4–11)

## 2025-06-12 ASSESSMENT — PAIN SCALES - GENERAL: PAINLEVEL_OUTOF10: SEVERE PAIN (7)

## 2025-06-12 NOTE — PROGRESS NOTES
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 Hx of Ulcerative colitis.     Subjective     HISTORY OF PRESENT ILLNESS     has no past medical history on file.   has a past surgical history that includes ENT surgery; Pr Lap,Surg,Colectomy, Partial, W/Anast; and Cholecystectomy.    Past meds:  -methotrexate - did not help (on max dose)  -AZA - (fevers, did not help)  -Sotyktu (drug trial, did not help)    Patient reports here to establish care for her rheumatologic care, previous patient of mine, seen last ~Fall 2024-- awaiting notes and records. She reports was later seen Jan 2025 by other provider and was told to start Remicade infusions - pt concerned as she has a history of demyelinating disorder so she did not start treatment. She continues to have chronic enthesitis of achilles heel b/l and b/l elbows and continued b/l hand pain and swelling worst in AM. She is off methotrexate now for 2 weeks and not noticing any flares so far.   Of note also found to have CKD and new renal mass - work up pending still.   UC has been in remission for many years      I have reviewed medical records and performed history and exam.    ASSESSMENT:  1. Spondyloarthritis    2. Ulcerative colitis without complications, unspecified location (H)    3. H/O demyelinating disease    4. High risk medication use    5. Immunosuppressed status    6. Renal mass    7. Chronic kidney disease, unspecified CKD stage        PLAN:    (M47.819) Spondyloarthritis  (primary encounter diagnosis)  Comment: Active continued enthesitis and inflammatory arthritis of hands  Plan: Med Therapy  Management Referral  -New start Rinvoq, R&B of therapy discussed at length including infection risk, high risk population for CVD, VTE - patient does not fall in this category, shingles.     (K51.90) Ulcerative colitis without complications, unspecified location (H)  Comment: In remission  Plan: Follows with health [facility]  -Request ANTOINETTE most recent note  -AVOID IL-17    (Z86.69) H/O demyelinating disease  Comment:   Plan:   -Following with neuro - no formal diagnosis yet  -AVOID TNFi    (Z79.899) High risk medication use  Comment:   Plan: Alkaline phosphatase, Creatinine, CBC with         Platelets & Differential, Erythrocyte         sedimentation rate auto, AST, CRP inflammation,        ALT  -Q3mo lab monitoring indicated with Sena therapy     (D84.9) Immunosuppressed status  Comment:   Plan:   -Baseline screening for tuberculosis and hepatitis B/C has been completed [or is pending] prior to initiation of  therapy, given the risk of infection reactivation with immunosuppressive treatment.  -Vaccines age appropriate for immunosuppressed patient, avoid live vaccines, requires shingles vaccine - per PCP.       (N28.89) Renal mass  Comment:   Plan:   -Pending further eval with urology    (N18.9) Chronic kidney disease, unspecified CKD stage  Comment:   Plan:   -pending further eval with urology  -Renal dose Rinvoq       Follow up in 3mo    Thank you very much for allowing me to participate in the care of  Zeynep Please call with any questions.    36 minutes were spent reviewing the patient s chart, evaluating and counseling the patient, and coordinating care.     Usha Marrero MD  [facility]     PAST MEDICAL HISTORY  History reviewed. No pertinent past medical history. reviewed  Past Surgical History:   Procedure Laterality Date    CHOLECYSTECTOMY      Pt reported    ENT SURGERY      MT LAP,SURG,COLECTOMY, PARTIAL, W/ANAST      Description: Laparoscopy Partial Colectomy With Anastomosis;   Recorded: 06/16/2011;    reviewed    MEDICATIONS  Current Outpatient Medications   Medication Sig Dispense Refill    amphetamine-dextroamphetamine (ADDERALL) 20 MG tablet dextroamphetamine-amphetamine 20 mg tablet   TAKE 1 TABLET BY MOUTH TWICE DAILY      dicyclomine (BENTYL) 20 MG tablet Take 20 mg by mouth As Needed      DiphenhydrAMINE HCl (BENADRYL PO) Take by mouth as needed      EPINEPHrine 0.3 MG/0.3ML injection 2-pack Inject 0.3 mg into the muscle as needed for anaphylaxis      Esomeprazole Magnesium (NEXIUM PO) Take 40 mg by mouth daily      famotidine (PEPCID) 40 MG tablet Take 40 mg by mouth 2 times daily      fluticasone (FLONASE) 50 MCG/ACT nasal spray 250 sprays At night      LO LOESTRIN FE 1 MG-10 MCG / 10 MCG TABS       Ondansetron (ZOFRAN ODT PO) Take by mouth 2 times daily      Rimegepant Sulfate (NURTEC) 75 MG TBDP Take 75 mg by mouth As needed PRN      Suvorexant (BELSOMRA) 20 MG tablet Take 1 tablet (20 mg) by mouth At Bedtime 30 tablet 5    tiZANidine (ZANAFLEX) 4 MG tablet Take 4 mg by mouth As needed      carBAMazepine (TEGRETOL XR) 100 MG 12 hr tablet Take 2 tablets (200 mg) by mouth 2 times daily (Patient not taking: Reported on 6/12/2025) 120 tablet 3    doxycycline monohydrate (MONODOX) 50 MG capsule TAKE 1 CAPSULE BY MOUTH TWICE DAILY WITH NON-DAIRY FOOD & WATER.SUPPLEMENT WITH A PROBIOTIC.      levothyroxine (SYNTHROID/LEVOTHROID) 50 MCG tablet daily (Patient not taking: Reported on 6/12/2025)      sertraline (ZOLOFT) 100 MG tablet TAKE 3 TABLETS BY MOUTH DAILY         ALLERGIES  Allergies   Allergen Reactions    Azithromycin Hives, Nausea, Nausea and Vomiting and Other (See Comments)     Upset stomach  Upset stomach    Other reaction(s): Nausea/Vomiting      Bee Venom Anaphylaxis    Black Pepper [Piper] Anaphylaxis    Cephalexin Angioedema, Hives and Swelling     Other reaction(s): hives      Clavulanic Acid Anaphylaxis    Contrast Dye Anaphylaxis    Food Anaphylaxis     Anaphylaxis  with peppers.  Anaphylaxis with peppers.      Iodinated Contrast Media Anaphylaxis, Other (See Comments) and Shortness Of Breath     Shortness of breath  Shortness of breath      Wasp Venom Anaphylaxis    Wasp Venom Protein Anaphylaxis    Amoxicillin Itching    Codeine Itching and Other (See Comments)    Droperidol Other (See Comments)     Hyperactivity     Morphine Headache and Itching     itching  Other reaction(s): Itching      Nabumetone GI Disturbance, Nausea and Vomiting and Other (See Comments)     Pt states she is unaware of ever taking this med.  GI upset      Other Food Allergy      Bell Peppers    Penicillins Hives    Prochlorperazine Unknown    Sulfa Antibiotics GI Disturbance       SOCIAL HISTORY  Social History     Socioeconomic History    Marital status:      Spouse name: Not on file    Number of children: Not on file    Years of education: Not on file    Highest education level: Not on file   Occupational History    Not on file   Tobacco Use    Smoking status: Never    Smokeless tobacco: Never   Substance and Sexual Activity    Alcohol use: No    Drug use: No    Sexual activity: Not Currently     Partners: Male     Birth control/protection: Pill   Other Topics Concern    Parent/sibling w/ CABG, MI or angioplasty before 65F 55M? Not Asked   Social History Narrative    Not on file     Social Drivers of Health     Financial Resource Strain: Medium Risk (3/8/2023)    Received from [facility]    Financial Resource Strain     Difficulty of Paying Living Expenses: Not on file     Difficulty of Paying Living Expenses: 3   Food Insecurity: Food Insecurity Present (1/18/2024)    Received from [facility]    Hunger Vital Sign     Worried About Running Out of Food in the Last Year: Sometimes true     Ran Out of Food in the Last Year: Never true   Transportation Needs: No Transportation Needs (1/18/2024)    Received from [facility]    PRAPARE - Transportation     Lack of  Transportation (Medical): No     Lack of Transportation (Non-Medical): No   Physical Activity: Insufficiently Active (1/18/2024)    Received from [facility]    Exercise Vital Sign     Days of Exercise per Week: 2 days     Minutes of Exercise per Session: 20 min   Stress: Not on file   Social Connections: Unknown (3/12/2024)    Received from [facility]    Social Connections     Frequency of Communication with Friends and Family: Not on file   Interpersonal Safety: Unknown (8/5/2024)    Received from [facility]    Humiliation, Afraid, Rape, and Kick questionnaire     Fear of Current or Ex-Partner: Not on file     Emotionally Abused: No     Physically Abused: No     Sexually Abused: No   Housing Stability: Low Risk  (1/18/2024)    Received from [facility]    Housing Stability     What is your living situation today?: I have a steady place to live    reviewed    FAMILY HISTORY  Family History   Problem Relation Age of Onset    Dementia Paternal Grandmother     Cancer Father         Kidney    Hypertension Father     Migraines Maternal Grandmother     Migraines Mother     Migraines Sister     No Known Problems Daughter     No Known Problems Maternal Grandfather     No Known Problems Paternal Grandmother     No Known Problems Paternal Grandfather     No Known Problems Maternal Aunt     No Known Problems Paternal Aunt     Kidney Disease Paternal Uncle         Required kidney transplant    Hereditary Breast and Ovarian Cancer Syndrome No family hx of     Breast Cancer No family hx of     Colon Cancer No family hx of     Endometrial Cancer No family hx of     Ovarian Cancer No family hx of    : reviewed .         Objective   PHYSICAL EXAMINATION  /67   Pulse 116   Wt 98 kg (216 lb)   SpO2 96%   BMI 33.09 kg/m  : reviewed    Physical Exam    GENERAL: Pleasant and cooperative, in no distress. Alert and Oriented x 3.  SKIN: no psoriasis. No  Raynaud's.    MUSCULOSKELETAL:  Shoulders: Normal ROM.  Elbows:  +b/l tender, No tophi or rheumatoid  nodules.  Wrists:  No Tenosynovitis dorsum of wrist. Full ROM.  Hands:  b/l tender MCP, PIP joints. Good .  Knees: No crepitus.  No swelling or effusion, or bulge sign.  Ankles:  No swelling. +b/l pain achilles tendon. No pain over plantar fascia.    LABS: Past labs reviewed and discussed with the patient.        Latest Ref Rng & Units 10/31/2022     9:40 AM 12/1/2022    12:28 PM 6/12/2025     3:36 PM   RHEUM RESULTS   Albumin 3.5 - 5.2 g/dL 4.3      ALT 0 - 50 U/L 21   18    AST 0 - 45 U/L 21   22    Creatinine 0.51 - 0.95 mg/dL 0.92   0.99    CRP Inflammation <5.00 mg/L   <3.00    GFR Estimate >60 mL/min/1.73m2 74   67    Hematocrit 35.0 - 47.0 % 44.1  43.6  48.5    Hemoglobin 11.7 - 15.7 g/dL 14.5  14.2  16.3    WBC 4.0 - 11.0 10e3/uL 9.0  12.9  11.8    RBC Count 3.80 - 5.20 10e6/uL 4.50  4.41  5.06    RDW 10.0 - 15.0 % 12.3  11.9  12.9    MCHC 31.5 - 36.5 g/dL 32.9  32.6  33.6    MCV 78 - 100 fL 98  99  96    Platelet Count 150 - 450 10e3/uL 337  328  344    Sed Rate 0 - 30 mm/hr   1        Last three Labs:  Hemoglobin   Date Value Ref Range Status   06/12/2025 16.3 (H) 11.7 - 15.7 g/dL Final   12/01/2022 14.2 11.7 - 15.7 g/dL Final   10/31/2022 14.5 11.7 - 15.7 g/dL Final     Urea Nitrogen   Date Value Ref Range Status   10/31/2022 14.1 6.0 - 20.0 mg/dL Final     Erythrocyte Sedimentation Rate   Date Value Ref Range Status   06/12/2025 1 0 - 30 mm/hr Final     AST   Date Value Ref Range Status   06/12/2025 22 0 - 45 U/L Final   10/31/2022 21 10 - 35 U/L Final     Albumin   Date Value Ref Range Status   10/31/2022 4.3 3.5 - 5.2 g/dL Final     Alkaline Phosphatase   Date Value Ref Range Status   06/12/2025 97 40 - 150 U/L Final   10/31/2022 92 35 - 104 U/L Final     ALT   Date Value Ref Range Status   06/12/2025 18 0 - 50 U/L Final   10/31/2022 21 10 - 35 U/L Final           IMAGING:     Reviewed    Usha KELLOGG  MD Janes  [facility]

## 2025-06-12 NOTE — TELEPHONE ENCOUNTER
Did not receive records, will have patient sign ANTOINETTE at office visit.      LUIS Rodriguez  Rheumatology/Infectious disease  [facility]   Rheumatology ph:[phone]  Infectious Disease ph:[phone]

## 2025-07-10 ENCOUNTER — PATIENT OUTREACH (OUTPATIENT)
Dept: CARE COORDINATION | Facility: CLINIC | Age: 55
End: 2025-07-10
Payer: MEDICARE

## 2025-07-14 ENCOUNTER — PATIENT OUTREACH (OUTPATIENT)
Dept: CARE COORDINATION | Facility: CLINIC | Age: 55
End: 2025-07-14
Payer: MEDICARE

## (undated) RX ORDER — LIDOCAINE HYDROCHLORIDE 10 MG/ML
INJECTION, SOLUTION EPIDURAL; INFILTRATION; INTRACAUDAL; PERINEURAL
Status: DISPENSED
Start: 2022-12-01